# Patient Record
Sex: FEMALE | Race: WHITE
[De-identification: names, ages, dates, MRNs, and addresses within clinical notes are randomized per-mention and may not be internally consistent; named-entity substitution may affect disease eponyms.]

---

## 2017-02-06 ENCOUNTER — HOSPITAL ENCOUNTER (OUTPATIENT)
Dept: HOSPITAL 17 - PHED | Age: 77
Setting detail: OBSERVATION
Discharge: HOME | End: 2017-02-06
Attending: FAMILY MEDICINE | Admitting: FAMILY MEDICINE
Payer: MEDICARE

## 2017-02-06 VITALS
RESPIRATION RATE: 16 BRPM | OXYGEN SATURATION: 91 % | HEART RATE: 81 BPM | TEMPERATURE: 99.7 F | SYSTOLIC BLOOD PRESSURE: 167 MMHG | DIASTOLIC BLOOD PRESSURE: 80 MMHG

## 2017-02-06 VITALS — BODY MASS INDEX: 28.37 KG/M2 | HEIGHT: 67 IN | WEIGHT: 180.78 LBS

## 2017-02-06 VITALS
RESPIRATION RATE: 19 BRPM | HEART RATE: 87 BPM | OXYGEN SATURATION: 93 % | DIASTOLIC BLOOD PRESSURE: 61 MMHG | SYSTOLIC BLOOD PRESSURE: 188 MMHG

## 2017-02-06 VITALS
SYSTOLIC BLOOD PRESSURE: 167 MMHG | RESPIRATION RATE: 20 BRPM | TEMPERATURE: 99.6 F | DIASTOLIC BLOOD PRESSURE: 76 MMHG | HEART RATE: 85 BPM | OXYGEN SATURATION: 94 %

## 2017-02-06 VITALS
DIASTOLIC BLOOD PRESSURE: 93 MMHG | RESPIRATION RATE: 16 BRPM | HEART RATE: 87 BPM | SYSTOLIC BLOOD PRESSURE: 159 MMHG | OXYGEN SATURATION: 93 %

## 2017-02-06 VITALS — OXYGEN SATURATION: 95 %

## 2017-02-06 VITALS — OXYGEN SATURATION: 93 %

## 2017-02-06 DIAGNOSIS — Z85.3: ICD-10-CM

## 2017-02-06 DIAGNOSIS — S61.411A: ICD-10-CM

## 2017-02-06 DIAGNOSIS — R07.89: Primary | ICD-10-CM

## 2017-02-06 DIAGNOSIS — M19.90: ICD-10-CM

## 2017-02-06 DIAGNOSIS — Z87.891: ICD-10-CM

## 2017-02-06 DIAGNOSIS — J06.9: ICD-10-CM

## 2017-02-06 DIAGNOSIS — J44.9: ICD-10-CM

## 2017-02-06 DIAGNOSIS — M06.9: ICD-10-CM

## 2017-02-06 DIAGNOSIS — F41.9: ICD-10-CM

## 2017-02-06 DIAGNOSIS — M11.20: ICD-10-CM

## 2017-02-06 LAB
ALP SERPL-CCNC: 100 U/L (ref 45–117)
ALT SERPL-CCNC: 16 U/L (ref 10–53)
ANION GAP SERPL CALC-SCNC: 7 MEQ/L (ref 5–15)
APTT BLD: 19.9 SEC (ref 24.3–30.1)
AST SERPL-CCNC: 33 U/L (ref 15–37)
BASOPHILS # BLD AUTO: 0.3 TH/MM3 (ref 0–0.2)
BASOPHILS NFR BLD: 1.5 % (ref 0–2)
BILIRUB SERPL-MCNC: 0.8 MG/DL (ref 0.2–1)
BUN SERPL-MCNC: 16 MG/DL (ref 7–18)
CHLORIDE SERPL-SCNC: 105 MEQ/L (ref 98–107)
CK MB SERPL-MCNC: 3 NG/ML (ref 0.5–3.6)
CK MB SERPL-MCNC: 3.2 NG/ML (ref 0.5–3.6)
CK SERPL-CCNC: 115 U/L (ref 26–192)
CK SERPL-CCNC: 122 U/L (ref 26–192)
CK SERPL-CCNC: 168 U/L (ref 26–192)
EOSINOPHIL # BLD: 0.4 TH/MM3 (ref 0–0.4)
EOSINOPHIL NFR BLD: 2.4 % (ref 0–4)
ERYTHROCYTE [DISTWIDTH] IN BLOOD BY AUTOMATED COUNT: 12.7 % (ref 11.6–17.2)
GFR SERPLBLD BASED ON 1.73 SQ M-ARVRAT: 74 ML/MIN (ref 89–?)
HCO3 BLD-SCNC: 31.9 MEQ/L (ref 21–32)
HCT VFR BLD CALC: 44.4 % (ref 35–46)
HEMO FLAGS: (no result)
INR PPP: 0.9 RATIO
LYMPHOCYTES # BLD AUTO: 1.6 TH/MM3 (ref 1–4.8)
LYMPHOCYTES NFR BLD AUTO: 9.1 % (ref 9–44)
MCH RBC QN AUTO: 32.1 PG (ref 27–34)
MCHC RBC AUTO-ENTMCNC: 33.5 % (ref 32–36)
MCV RBC AUTO: 95.7 FL (ref 80–100)
MONOCYTES NFR BLD: 7.9 % (ref 0–8)
NEUTROPHILS # BLD AUTO: 13.7 TH/MM3 (ref 1.8–7.7)
NEUTROPHILS NFR BLD AUTO: 79.1 % (ref 16–70)
PLAT MORPH BLD: (no result)
PLATELET # BLD: 105 TH/MM3 (ref 150–450)
PLATELET BLD QL SMEAR: (no result)
POTASSIUM SERPL-SCNC: 4.3 MEQ/L (ref 3.5–5.1)
PROTHROMBIN TIME: 10 SEC (ref 9.8–11.6)
RBC # BLD AUTO: 4.65 MIL/MM3 (ref 4–5.3)
SCAN/DIFF: (no result)
SODIUM SERPL-SCNC: 144 MEQ/L (ref 136–145)
WBC # BLD AUTO: 17.4 TH/MM3 (ref 4–11)

## 2017-02-06 PROCEDURE — 85610 PROTHROMBIN TIME: CPT

## 2017-02-06 PROCEDURE — 73130 X-RAY EXAM OF HAND: CPT

## 2017-02-06 PROCEDURE — 85730 THROMBOPLASTIN TIME PARTIAL: CPT

## 2017-02-06 PROCEDURE — 71010: CPT

## 2017-02-06 PROCEDURE — 90714 TD VACC NO PRESV 7 YRS+ IM: CPT

## 2017-02-06 PROCEDURE — 84484 ASSAY OF TROPONIN QUANT: CPT

## 2017-02-06 PROCEDURE — 99285 EMERGENCY DEPT VISIT HI MDM: CPT

## 2017-02-06 PROCEDURE — 94640 AIRWAY INHALATION TREATMENT: CPT

## 2017-02-06 PROCEDURE — 82550 ASSAY OF CK (CPK): CPT

## 2017-02-06 PROCEDURE — 80053 COMPREHEN METABOLIC PANEL: CPT

## 2017-02-06 PROCEDURE — 82552 ASSAY OF CPK IN BLOOD: CPT

## 2017-02-06 PROCEDURE — 12013 RPR F/E/E/N/L/M 2.6-5.0 CM: CPT

## 2017-02-06 PROCEDURE — 94150 VITAL CAPACITY TEST: CPT

## 2017-02-06 PROCEDURE — G0378 HOSPITAL OBSERVATION PER HR: HCPCS

## 2017-02-06 PROCEDURE — 94664 DEMO&/EVAL PT USE INHALER: CPT

## 2017-02-06 PROCEDURE — 93005 ELECTROCARDIOGRAM TRACING: CPT

## 2017-02-06 PROCEDURE — 85025 COMPLETE CBC W/AUTO DIFF WBC: CPT

## 2017-02-06 PROCEDURE — 90471 IMMUNIZATION ADMIN: CPT

## 2017-02-06 NOTE — PD
HPI


Chief Complaint:  Cold / Flu Symptoms


Time Seen by Provider:  07:00


Travel History


International Travel<30 days:  No


Contact w/Intl Traveler<30days:  No


Traveled to known affect area:  No





History of Present Illness


HPI


76 years old female complains of left chest discomfort.  Patient states the 

symptoms started about 9 days ago.  Patient states that she has persistent left 

chest discomfort, not associated with exertion.  Patient denies any pain 

radiation.  Patient denies palpitation nausea vomiting diaphoresis.  Patient 

denies any fever chills.  Patient states that she has intermittent mild 

productive cough for the past 5 days.  Patient has history of COPD.  Patient 

contacted her physician about 5 days ago and was given prescription for Medrol 

Dosepak.  Patient states that the discomfort got better with the Medrol 

Dosepak.  Patient denies any history of CAD.  Patient denies history 

hypertension, diabetes, dyslipidemia.  Patient states that she injured right 

hand on the elevator door last night.  Patient states that she has a laceration 

on the back of her right hand.  Patient's not up-to-date with TD booster.





PFSH


Past Medical History


Hx Anticoagulant Therapy:  No


Anxiety:  Yes


COPD:  Yes


Diabetes:  No


Respiratory:  Yes (COPD)


Pregnant?:  Not Pregnant


Menopausal:  Yes





Past Surgical History


 Section:  Yes (X2)


Hysterectomy:  No


Neurologic Surgery:  Yes (DISCECTOMY)


Tonsillectomy:  Yes


Other Surgery:  Yes (LUMPECTOMY RIGHT BREAST)





Social History


Alcohol Use:  Yes (WINE WITH DINNER)


Tobacco Use:  No


Substance Use:  No





Allergies-Medications


(Allergen,Severity, Reaction):  


Coded Allergies:  


     Shellfish (Verified  Allergy, Severe, Anaphylaxis, 17)


Reported Meds & Prescriptions





Reported Meds & Active Scripts


Active


Reported


Prednisone 1 Mg Tab 1 Mg PO AS DIRECTED


Paxil (Paroxetine HCl) 10 Mg Tab Unknown Dose PO DAILY


Prednisone 1 Mg Tab 7.5 PO DAILY


Plaquenil (Hydroxychloroquine Sulfate) 200 Mg Tab Unknown Dose PO BID


     Take with food








Review of Systems


General / Constitutional:  No: Fever


Eyes:  No: Visual changes


HENT:  No: Headaches


Cardiovascular:  Positive: Chest Pain or Discomfort


Respiratory:  No: Shortness of Breath


Gastrointestinal:  No: Abdominal Pain


Genitourinary:  No: Dysuria


Musculoskeletal:  Positive: Pain


Skin:  No Rash


Neurologic:  No: Weakness


Psychiatric:  No: Depression


Endocrine:  No: Polydipsia


Hematologic/Lymphatic:  No: Easy Bruising





Physical Exam


Narrative


GENERAL: Well-nourished, well-developed patient.


SKIN: Warm and dry.


HEAD: Normocephalic.


EYES: No scleral icterus. No injection or drainage. 


NECK: Supple, trachea midline. No JVD or lymphadenopathy.


CARDIOVASCULAR: Regular rate and rhythm without murmurs, gallops, or rubs. 


RESPIRATORY: Breath sounds equal bilaterally. No accessory muscle use.  Patient 

had mild expiratory wheezes on the right lung.  Few rhonchi at the bases.


GASTROINTESTINAL: Abdomen soft, non-tender, nondistended. 


MUSCULOSKELETAL: No cyanosis, or edema.  Patient has a 3 cm skin avulsion 

laceration dorsal aspect of the right hand. 


BACK: Nontender without obvious deformity. No CVA tenderness. 


Neurologic exam normal.





Data


Data


Last Documented VS





Vital Signs








  Date Time  Temp Pulse Resp B/P Pulse Ox O2 Delivery O2 Flow Rate FiO2


 


17 07:15     95 Room Air  


 


17 06:50 99.6 85 20 167/76    








Orders





 Electrocardiogram (17 07:08)


Complete Blood Count With Diff (17 07:08)


Comprehensive Metabolic Panel (17 07:08)


Creatine Kinase (Cpk) (17 07:08)


Troponin I (17 07:08)


Prothrombin Time / Inr (Pt) (17 07:08)


Act Partial Throm Time (Ptt) (17 07:08)


Chest, Single Ap (17 07:08)


Iv Access Insert/Monitor (17 07:08)


Ecg Monitoring (17 07:08)


Oximetry (17 07:08)


Hand, Complete (Xfx1cil) (17 07:08)


Tetanus/Diphtheria Tox Adult (Tetanus/Di (17 07:15)





Labs





 Laboratory Tests








Test 17





 07:35


 


White Blood Count 17.4 TH/MM3


 


Red Blood Count 4.65 MIL/MM3


 


Hemoglobin 14.9 GM/DL


 


Hematocrit 44.4 %


 


Mean Corpuscular Volume 95.7 FL


 


Mean Corpuscular Hemoglobin 32.1 PG


 


Mean Corpuscular Hemoglobin 33.5 %





Concent 


 


Red Cell Distribution Width 12.7 %


 


Platelet Count 105 TH/MM3


 


Mean Platelet Volume 9.6 FL


 


Neutrophils (%) (Auto) 79.1 %


 


Lymphocytes (%) (Auto) 9.1 %


 


Monocytes (%) (Auto) 7.9 %


 


Eosinophils (%) (Auto) 2.4 %


 


Basophils (%) (Auto) 1.5 %


 


Neutrophils # (Auto) 13.7 TH/MM3


 


Lymphocytes # (Auto) 1.6 TH/MM3


 


Monocytes # (Auto) 1.4 TH/MM3


 


Eosinophils # (Auto) 0.4 TH/MM3


 


Basophils # (Auto) 0.3 TH/MM3


 


CBC Comment AUTO DIFF 


 


Differential Comment AUTO DIFF





 CONFIRMED


 


Platelet Estimate LOW 


 


Platelet Morphology Comment ENLARGED 


 


Prothrombin Time 10.0 SEC


 


Prothromb Time International 0.9 RATIO





Ratio 


 


Activated Partial 19.9 SEC





Thromboplast Time 


 


Sodium Level 144 MEQ/L


 


Potassium Level 4.3 MEQ/L


 


Chloride Level 105 MEQ/L


 


Carbon Dioxide Level 31.9 MEQ/L


 


Anion Gap 7 MEQ/L


 


Blood Urea Nitrogen 16 MG/DL


 


Creatinine 0.76 MG/DL


 


Estimat Glomerular Filtration 74 ML/MIN





Rate 


 


Random Glucose 109 MG/DL


 


Calcium Level 8.5 MG/DL


 


Total Bilirubin 0.8 MG/DL


 


Aspartate Amino Transf 33 U/L





(AST/SGOT) 


 


Alanine Aminotransferase 16 U/L





(ALT/SGPT) 


 


Alkaline Phosphatase 100 U/L


 


Total Creatine Kinase 168 U/L


 


Troponin I 0.02 NG/ML


 


Total Protein 6.8 GM/DL


 


Albumin 3.6 GM/DL











[facility]


Medical Decision Making


Medical Screen Exam Complete:  Yes


Emergency Medical Condition:  Yes


Interpretation(s)


8:39 AM.  CBC WBC 17.4.  Platelet 105.  79 neutrophil.  CMP within normal 

limit.  Cardiac enzymes are normal.


9:13 AM.


Last Impressions








Chest X-Ray 17 0708 Signed





Impressions: 





 Service Date/Time:  2017 07:57 - CONCLUSION: No acute 





 disease.       Elieser Montejo MD 





X-ray right hand shows chronic changes.


Differential Diagnosis


Differential diagnosis including acute exacerbation COPD, bronchitis, pneumonia

, angina, MI, PE, pneumothorax.


Narrative Course


76 years old female with left chest discomfort, coughing, right hand injury.





Procedures


**Procedure Narrative**


Saline wash.  Dermabond applied to the laceration to the right hand.





Diagnosis





 Primary Impression:  


 Chest pain


 Qualified Code:  R07.9 - Chest pain, unspecified type


 Additional Impressions:  


 Laceration of right hand


 Qualified Code:  S61.411A - Laceration of right hand, initial encounter


 URI (upper respiratory infection)


 Qualified Code:  J06.9 - Upper respiratory tract infection, unspecified type








Skyler Cavazos MD 2017 07:20

## 2017-02-06 NOTE — RADHPO
EXAM DATE/TIME:  2017 08:01 

 

[facility] COMPARISON:     

No previous studies available for comparison.

 

                     

INDICATIONS:     

Right posterior hand laceration/pain across the metacarpals after getting had caught in elevator door
.

                     

 

MEDICAL HISTORY:     

Chronic obstructive pulmonary disease.          

 

SURGICAL HISTORY:     

 section.   Discectomy. Lumpectomy.

 

ENCOUNTER:     

Initial                                        

 

ACUITY:     

1 day      

 

PAIN SCORE:     

7/10

 

LOCATION:     

Right posterior hand.

 

FINDINGS:     

There is no acute fracture or dislocation of the right hand.  Moderate osteoarthritis is noted involv
ing the first carpal metacarpal joint.  Chondrocalcinosis is noted involving the expected region of t
he triangular fibrocartilage complex.  Subchondral cyst is noted involving the lunate.  

 

CONCLUSION:     

 

1.  No acute fracture or dislocation. 

2.  Moderate osteoarthritis involving the first carpal metacarpal joint.  

3.  Chondrocalcinosis involving the triangular fibrocartilage complex. 

4.  Subchondral cyst within the lunate.  

 

 Elieser Montejo MD on 2017 at 8:53                

Board Certified Radiologist.

 This report was verified electronically.

## 2017-02-06 NOTE — RADHPO
EXAM DATE/TIME:  2017 07:57 

 

[facility] COMPARISON:     

CHEST SINGLE AP, 2015, 7:39.

 

                     

INDICATIONS :     

Left chest discomfort, cough.

                     

 

MEDICAL HISTORY :     

Chronic obstructive pulmonary disease.          

 

SURGICAL HISTORY :     

 section.   Discectomy. Lumpectomy.

 

ENCOUNTER:     

Initial                                        

 

ACUITY:     

2 weeks      

 

PAIN SCORE:     

7/10

 

LOCATION:     

Left chest 

 

FINDINGS:     

A single view of the chest demonstrates the lungs to be symmetrically aerated without evidence of mas
s, infiltrate or effusion.  The cardiomediastinal contours are unremarkable.  Osseous structures are 
intact.

 

CONCLUSION:     No acute disease.  

 

 

 

 Elieser Montejo MD on 2017 at 8:39           

Board Certified Radiologist.

 This report was verified electronically.

## 2017-02-06 NOTE — HHI.DCPOC
Discharge Care Plan


Diagnosis:  


(1) Chest pressure


(2) Chronic obstructive pulmonary disease


Goals to Promote Your Health


* To prevent worsening of your condition and complications


* To maintain your health at the optimal level


Directions to Meet Your Goals


*** Take your medications as prescribed


*** Follow your dietary instruction


*** Follow activity as directed








*** Keep your appointments as scheduled


*** Take your immunizations and boosters as scheduled


*** If your symptoms worsen call your PCP, if no PCP go to Urgent Care Center 

or Emergency Room***


*** Smoking is Dangerous to Your Health. Avoid second hand smoke***


***Call the 24-hour hour crisis hotline for domestic abuse at 1-369.888.1615***








Grant Glass Feb 6, 2017 13:27

## 2017-02-06 NOTE — HHI.HP
__________________________________________________





[facility]


Service


[facility]


Primary Care Physician


Evelyn Villalobos MD


Admission Diagnosis


chest pain.  URI.


Diagnoses:  


(1) Chest pressure


Diagnosis:  Principal





(2) Chronic obstructive pulmonary disease


Diagnosis:  Secondary





(3) Leukocytosis


Diagnosis:  Secondary





(4) Rheumatoid arthritis


Diagnosis:  Secondary





(5) Anxiety


Chief Complaint:  


Chest pressure


Travel History


International Travel<30 Days:  No


Contact w/Intl Traveler <30 Da:  No


Traveled to Known Affected Are:  No


History of Present Illness


76-year-old  female with known history of chronic obstructive 

pulmonary disease, anxiety, rheumatoid arthritis who presented to hospital 

because of chest discomfort.  Patient states that she has been having symptoms 

for up to 2 weeks with a pressure type sensation in her left anterior chest 

that has remained persistent but improving without any radiation to the neck, 

back, shoulder, arm.  Denied any nausea, vomiting, diaphoresis.  Patient states 

he started developing cough, congestion, phlegm production with yellow 

coloration with some minimal shortness of breath with associated wheeze.  The 

patient does have chronic obstructive pulmonary disease and she thought that 

she may be developing bronchitis so she called her pulmonologist, Dr. thakur 

approximately a week ago and was prescribed a steroid pack in which she is down 

to 10 mg daily this time.  Patient states that she usually gets prescribed an 

antibiotic whenever this occurs, however her primary doctor did not do at this 

time.  She states that the discomfort has significantly improved and her wheeze 

has improved.  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 

party and she overdid herself.  She woke up this morning with increased wheeze 

and chest pressure so she came to the hospital for evaluation.  Patient is 

hoping to get prescribed a antibiotic can be sent home.  However because of her 

chest discomfort, it was recommended by the ER physician that the patient be 

observed in the chest pain center for further evaluation management.





Review of Systems


Constitutional:  DENIES: Diaphoretic episodes, Fatigue, Fever, Weight gain, 

Weight loss, Chills, Dizziness, Change in appetite, Night Sweats


Eyes:  DENIES: Blurred vision, Diplopia, Eye inflammation, Eye pain, Vision loss

, Double Vision


Ears, nose, mouth, throat:  DENIES: Vertigo, Nasal discharge, Throat pain, Ear 

Pain, Running Nose, Toothache


Respiratory:  COMPLAINS OF: Cough,  DENIES: Apneas, Snoring, Wheezing, 

Hemoptysis, Sputum production, Shortness of breath


Cardiovascular:  COMPLAINS OF: Chest pain (pressure),  DENIES: Palpitations, 

Syncope, Dyspnea on Exertion, Lower Extremity Edema, Orthopnea


Gastrointestinal:  DENIES: Abdominal pain, Black stools, Bloody stools, 

Constipation, Diarrhea, Nausea, Vomiting, Difficulty Swallowing, Anorexia


Neurologic:  DENIES: Abnormal gait, Headache, Localized weakness, Paresthesias, 

Seizures, Speech Problems, Tremor, Poor Balance


Psychiatric:  COMPLAINS OF: Anxiety,  DENIES: Confusion, Mood changes, 

Depression





Past Family Social History


Past Medical History


Chronic obstructive pulmonary disease


Rheumatoid arthritis


History of breast cancer, survival for 7 years


Anxiety


Past Surgical History


Cataract surgery


Tonsillectomy


Back surgery


Right breast lumpectomy


Reported Medications





Reported Meds & Active Scripts


Active


Reported


Prednisone 1 Mg Tab 1 Mg PO AS DIRECTED


Paxil (Paroxetine HCl) 10 Mg Tab Unknown Dose PO DAILY


Prednisone 1 Mg Tab 7.5 PO DAILY


Plaquenil (Hydroxychloroquine Sulfate) 200 Mg Tab Unknown Dose PO BID


     Take with food


Allergies:  


Coded Allergies:  


     Shellfish (Verified  Allergy, Severe, Anaphylaxis, 17)


Family History


Reviewed is significant for mother  from throat cancer


Social History


Patient quit smoking in , prior to that she smoked up to 2 pack a 

cigarettes a day since she was 19 years old.  She does drink 2 glasses of wine 

daily.  Denies any illicit drugs





Physical Exam


Vital Signs





 Vital Signs








  Date Time  Temp Pulse Resp B/P Pulse Ox O2 Delivery O2 Flow Rate FiO2


 


17 12:31 99.7 81 16 167/80 91   


 


17 11:12  87 16 159/93 93 Room Air  


 


17 11:06      Room Air  21


 


17 11:00     95   21


 


17 09:57  87 19 188/61 93 Room Air  


 


17 09:56     93 Room Air  


 


17 07:15     95 Room Air  


 


17 06:50 99.6 85 20 167/76 94   








Physical Exam


GENERAL: Well-developed, well-nourished, in no acute distress. alert and 

orientated


HEENT: Head is normocephalic without any lesions or masses noted. Facial 

features are symmetric. Eyes: Pupils equal round reactive to light. Extraocular 

muscles are intact. Conjunctivae were clear. Oropharyngeal: Pharynx without any 

erythema edema. Tongue is midline without deviation. Buccal mucosa is moist 

without any masses or lesions


NECK: Supple without any masses. Trachea midline no deviation. No JVD, no 

bruits are appreciated


CARDIAC: Regular rhythm, regular rate. S1/S2 are heard. No murmurs gallops or 

rubs.


LUNGS: Mild expiratory wheeze noted in the left anterior chest, no rhonchi or 

rales. No use of accessory muscles on inspiration or expiration.


ABDOMEN: Soft, nontender. Nondistended. Bowel sounds heard in all 4 quadrants. 

No organomegaly or masses. Negative rebound, negative guarding


EXTREMITIES: No edema, pulses are equal bilaterally. No cyanosis or clubbing


NEUROLOGY: Mood and affect appear appropriate. Cranial nerves II through XII 

grossly intact. Muscle strength 5/5 in upper and lower extremities bilaterally. 

Deep tendon reflexes are 2+ in upper and lower extremities bilaterally.


Laboratory





Laboratory Tests








Test 17





 07:35 11:30


 


White Blood Count 17.4  


 


Red Blood Count 4.65  


 


Hemoglobin 14.9  


 


Hematocrit 44.4  


 


Mean Corpuscular Volume 95.7  


 


Mean Corpuscular Hemoglobin 32.1  


 


Mean Corpuscular Hemoglobin 33.5  





Concent  


 


Red Cell Distribution Width 12.7  


 


Platelet Count 105  


 


Mean Platelet Volume 9.6  


 


Neutrophils (%) (Auto) 79.1  


 


Lymphocytes (%) (Auto) 9.1  


 


Monocytes (%) (Auto) 7.9  


 


Eosinophils (%) (Auto) 2.4  


 


Basophils (%) (Auto) 1.5  


 


Neutrophils # (Auto) 13.7  


 


Lymphocytes # (Auto) 1.6  


 


Monocytes # (Auto) 1.4  


 


Eosinophils # (Auto) 0.4  


 


Basophils # (Auto) 0.3  


 


CBC Comment AUTO DIFF  


 


Differential Comment AUTO DIFF 





 CONFIRMED 


 


Platelet Estimate LOW  


 


Platelet Morphology Comment ENLARGED  


 


Prothrombin Time 10.0  


 


Prothromb Time International 0.9  





Ratio  


 


Activated Partial 19.9  





Thromboplast Time  


 


Sodium Level 144  


 


Potassium Level 4.3  


 


Chloride Level 105  


 


Carbon Dioxide Level 31.9  


 


Anion Gap 7  


 


Blood Urea Nitrogen 16  


 


Creatinine 0.76  


 


Estimat Glomerular Filtration 74  





Rate  


 


Random Glucose 109  


 


Calcium Level 8.5  


 


Total Bilirubin 0.8  


 


Aspartate Amino Transf 33  





(AST/SGOT)  


 


Alanine Aminotransferase 16  





(ALT/SGPT)  


 


Alkaline Phosphatase 100  


 


Total Creatine Kinase 168  122 


 


Troponin I 0.02  0.03 


 


Total Protein 6.8  


 


Albumin 3.6  


 


Creatine Kinase MB  3.2 








Result Diagram:  


17





Imaging





Last Impressions








Hand X-Ray 17 Signed





Impressions: 





 Service Date/Time:  2017 08:01 - CONCLUSION:   1.  No 

acute 





 fracture or dislocation.  2.  Moderate osteoarthritis involving the first 

carpal 





 metacarpal joint.   3.  Chondrocalcinosis involving the triangular 





 fibrocartilage complex.  4.  Subchondral cyst within the lunate.     Elieser Montejo MD 


 


Chest X-Ray 17 Signed





Impressions: 





 Service Date/Time:  2017 07:57 - CONCLUSION: No acute 





 disease.       Elieser Montejo MD 











Assessment and Plan


Assessment and Plan


Chest pressure


Etiology indeterminate this time, could be secondary to COPD, bronchitis, 

upper respiratory infection, cardiac etiology


Patient with minimal risk factors include age, history of tobacco use


Thus far, cardiac enzymes are negative for any acute coronary event, will 

continue 1 more set


Thus far EKG shows sinus rhythm with lateral T-wave changes which are 

nonsignificant without any changes


Continue aspirin and nitroglycerin as needed


Patient indicates that this is same chest discomfort that she gets whenever 

she has COPD exacerbation, bronchitis.  Discussed with patient's primary 

medical doctor Dr. Villalobos, notified her that patient appears to have 

noncardiac chest pain, patient does not feel like she needs to undergo any 

cardiac testing because this is same symptoms she has when she has her lung 

problems.  Patient was notified to follow-up with Dr. Villalobos.  Dr. Villalobos 

is aware and agrees with plan of care.





Chronic obstructive pulmonary disease, possible early bronchitis


Continue O2 supplementation maintain O2 sats greater than 92%


Give 1 dose of Solu-Medrol 60 mg IV, Continue prednisone 10 mg by mouth twice 

daily


Start Levaquin 500 mg daily, patient does appear to have some underlying 

possible chronic kidney disease stage III


Continue nebulizer treatments every 6 hours and every 2 hours as needed


Start incentive spirometry


Start Robitussin Cough medicine





Leukocytosis


Likely secondary to recent steroid use


Continue follow CBC





Rheumatoid arthritis


Continue Plaquenil





Anxiety


Continue Paxil





DVT prevention


Sequential compression devices





Written by Grant Glass PA-C, acting as scribe for Dr. Garcia on 17 at 

1330.  


The documentation accurately reflects the work and decisions performed face-to-

face by Dr. Garcia on 17 at 1330.








Discharge disposition





Discharge home in stable condition





Activity: Ad darin.





Diet: Healthy heart diet





Medications per medication reconciliation





Follow-up primary medical doctor in one week





Problem Qualifiers





(1) Chronic obstructive pulmonary disease:  


Qualified Code:  J44.9 - Chronic obstructive pulmonary disease, unspecified 

COPD type


(2) Leukocytosis:  


Qualified Code:  D72.829 - Leukocytosis, unspecified type


(3) Rheumatoid arthritis:  


Qualified Code:  M06.9 - Rheumatoid arthritis, involving unspecified site, 

unspecified rheumatoid factor presence





Grant Glass 2017 13:09

## 2017-02-06 NOTE — EKG
Date Performed: 02/06/2017       Time Performed: 07:39:06

 

PTAGE:      76 years

 

EKG:      Sinus rhythm 

 

 Lateral T wave changes are nonspecific Borderline ECG

 

PREVIOUS TRACING       : 01/12/2015 06.46

 

DOCTOR:   Larry Blackwell  Interpretating Date/Time  02/06/2017 11:34:49

## 2017-02-07 NOTE — EKG
Date Performed: 02/06/2017       Time Performed: 14:09:08

 

PTAGE:      76 years

 

EKG:      Sinus rhythm 

 

 with PAC(s). Since previous tracing, no significant change noted Borderline ECG

 

PREVIOUS TRACING       : 02/06/2017 11.09

 

DOCTOR:   Kenyon Núñez  Interpretating Date/Time  02/07/2017 17:30:29

## 2017-02-07 NOTE — EKG
Date Performed: 02/06/2017       Time Performed: 11:09:20

 

PTAGE:      76 years

 

EKG:      Sinus rhythm 

 

. Lateral T wave changes are nonspecific Since previous tracing, no significant change noted Borderli
ne ECG

 

PREVIOUS TRACING       : 02/06/2017 07.39

 

DOCTOR:   Kenyon Núñez  Interpretating Date/Time  02/07/2017 17:30:12

## 2017-03-02 ENCOUNTER — HOSPITAL ENCOUNTER (OUTPATIENT)
Dept: HOSPITAL 17 - CLAB | Age: 77
End: 2017-03-02
Attending: INTERNAL MEDICINE
Payer: MEDICARE

## 2017-03-02 DIAGNOSIS — R53.83: Primary | ICD-10-CM

## 2017-03-02 DIAGNOSIS — R07.89: ICD-10-CM

## 2017-03-02 DIAGNOSIS — I49.9: ICD-10-CM

## 2017-03-02 LAB — T4 FREE SERPL-MCNC: 1.09 NG/DL (ref 0.76–1.46)

## 2017-03-02 PROCEDURE — 84443 ASSAY THYROID STIM HORMONE: CPT

## 2017-03-02 PROCEDURE — 36415 COLL VENOUS BLD VENIPUNCTURE: CPT

## 2017-03-02 PROCEDURE — 84439 ASSAY OF FREE THYROXINE: CPT

## 2017-04-12 ENCOUNTER — HOSPITAL ENCOUNTER (OUTPATIENT)
Dept: HOSPITAL 17 - HRSP | Age: 77
End: 2017-04-12
Attending: INTERNAL MEDICINE
Payer: MEDICARE

## 2017-04-12 DIAGNOSIS — J44.9: Primary | ICD-10-CM

## 2017-04-12 PROCEDURE — 94726 PLETHYSMOGRAPHY LUNG VOLUMES: CPT

## 2017-04-12 PROCEDURE — 94729 DIFFUSING CAPACITY: CPT

## 2017-04-12 PROCEDURE — 94620: CPT

## 2017-04-12 PROCEDURE — 94060 EVALUATION OF WHEEZING: CPT

## 2017-04-12 NOTE — RSPPFT
DATE OF PROCEDURE:   4/12/17



COMMENTS:   



VOLUMES

DYNAMIC:    FVC mildly reduced; FEV1 moderately reduced.

STATIC:    VTG moderately increased; RV severely increased; TLC normal.

FLOWS:    FEV1% moderately reduced; FEF 25-75 severely reduced.

DIFFUSION:    Normal.

FLOW VOLUME

      LOOP:    Pattern of variable intrathoracic airways obstruction.  



IMPRESSION:    

   

Moderately severe obstructive ventilatory defect with significant hyperinflation. Airways 
resistance is increased. There is improvement post-bronchodilator. Diffusion is normal.

## 2017-11-26 ENCOUNTER — HOSPITAL ENCOUNTER (EMERGENCY)
Dept: HOSPITAL 17 - NEPC | Age: 77
Discharge: HOME | End: 2017-11-26
Payer: MEDICARE

## 2017-11-26 VITALS
RESPIRATION RATE: 16 BRPM | DIASTOLIC BLOOD PRESSURE: 81 MMHG | HEART RATE: 88 BPM | SYSTOLIC BLOOD PRESSURE: 182 MMHG | OXYGEN SATURATION: 95 % | TEMPERATURE: 98.4 F

## 2017-11-26 VITALS — WEIGHT: 178.57 LBS | HEIGHT: 66 IN | BODY MASS INDEX: 28.7 KG/M2

## 2017-11-26 VITALS — SYSTOLIC BLOOD PRESSURE: 137 MMHG | DIASTOLIC BLOOD PRESSURE: 83 MMHG

## 2017-11-26 DIAGNOSIS — J44.1: ICD-10-CM

## 2017-11-26 DIAGNOSIS — J40: Primary | ICD-10-CM

## 2017-11-26 LAB
ANION GAP SERPL CALC-SCNC: 7 MEQ/L (ref 5–15)
BASOPHILS # BLD AUTO: 0 TH/MM3 (ref 0–0.2)
BASOPHILS NFR BLD: 0.1 % (ref 0–2)
BUN SERPL-MCNC: 27 MG/DL (ref 7–18)
CHLORIDE SERPL-SCNC: 100 MEQ/L (ref 98–107)
EOSINOPHIL # BLD: 0 TH/MM3 (ref 0–0.4)
EOSINOPHIL NFR BLD: 0 % (ref 0–4)
ERYTHROCYTE [DISTWIDTH] IN BLOOD BY AUTOMATED COUNT: 12.8 % (ref 11.6–17.2)
GFR SERPLBLD BASED ON 1.73 SQ M-ARVRAT: 89 ML/MIN (ref 89–?)
HCO3 BLD-SCNC: 31 MEQ/L (ref 21–32)
HCT VFR BLD CALC: 43.2 % (ref 35–46)
HEMO FLAGS: (no result)
LYMPHOCYTES # BLD AUTO: 0.5 TH/MM3 (ref 1–4.8)
LYMPHOCYTES NFR BLD AUTO: 3.4 % (ref 9–44)
MCH RBC QN AUTO: 32.4 PG (ref 27–34)
MCHC RBC AUTO-ENTMCNC: 34 % (ref 32–36)
MCV RBC AUTO: 95.3 FL (ref 80–100)
MONOCYTES NFR BLD: 9.8 % (ref 0–8)
NEUTROPHILS # BLD AUTO: 12 TH/MM3 (ref 1.8–7.7)
NEUTROPHILS NFR BLD AUTO: 86.7 % (ref 16–70)
PLATELET # BLD: 153 TH/MM3 (ref 150–450)
POTASSIUM SERPL-SCNC: 3.9 MEQ/L (ref 3.5–5.1)
RBC # BLD AUTO: 4.53 MIL/MM3 (ref 4–5.3)
SODIUM SERPL-SCNC: 138 MEQ/L (ref 136–145)
WBC # BLD AUTO: 13.9 TH/MM3 (ref 4–11)

## 2017-11-26 PROCEDURE — 87040 BLOOD CULTURE FOR BACTERIA: CPT

## 2017-11-26 PROCEDURE — 96374 THER/PROPH/DIAG INJ IV PUSH: CPT

## 2017-11-26 PROCEDURE — 71010: CPT

## 2017-11-26 PROCEDURE — 80048 BASIC METABOLIC PNL TOTAL CA: CPT

## 2017-11-26 PROCEDURE — 99285 EMERGENCY DEPT VISIT HI MDM: CPT

## 2017-11-26 PROCEDURE — 85025 COMPLETE CBC W/AUTO DIFF WBC: CPT

## 2017-11-26 PROCEDURE — 94664 DEMO&/EVAL PT USE INHALER: CPT

## 2017-11-26 RX ADMIN — IPRATROPIUM BROMIDE AND ALBUTEROL SULFATE SCH AMPULE: .5; 3 SOLUTION RESPIRATORY (INHALATION) at 10:47

## 2017-11-26 NOTE — PD
HPI


.


Shortness of breath


Chief Complaint:  Respiratory Symptoms


Time Seen by Provider:  10:38


Travel History


International Travel<30 days:  No


Contact w/Intl Traveler<30days:  No


Traveled to known affect area:  No





History of Present Illness


HPI


This patient presents with chief complaint of shortness of breath.  Onset was 5 

days ago.  Shortness of breath is exacerbated by eating.  It is associated with 

a cough productive of purulent sputum.  It has been unrelieved by a beta 

agonist MDI.  No associated fevers or chills.





Patient reports a history of COPD.  She has an albuterol MDI which she uses as 

a rescue inhaler.  She was on a cruise for the past 5 days.  She got off the 

ship this morning.  She states that she developed the cough and shortness of 

breath at about the time the cruise started.  She states that she went and saw 

the ship's doctor who gave her the beta agonist MDI.  She has been using that 

but is not getting any better.  She subsequently presents directly to us from 

the ship for evaluation of her shortness of breath and productive cough.





[facility]


Past Medical History


Hx Anticoagulant Therapy:  No


Anxiety:  Yes


COPD:  Yes


Diabetes:  No


Diminished Hearing:  No


Respiratory:  Yes (copd)


Pneumonia:  Yes


Pregnant?:  Not Pregnant


Menopausal:  Yes





Past Surgical History


 Section:  Yes (X2)


Hysterectomy:  No


Neurologic Surgery:  Yes (DISCECTOMY)


Tonsillectomy:  Yes


Other Surgery:  Yes (LUMPECTOMY RIGHT BREAST)





Social History


Alcohol Use:  Yes (WINE 1-2 GLASSES WITH DINNER)


Tobacco Use:  No


Substance Use:  No





Allergies-Medications


(Allergen,Severity, Reaction):  


Coded Allergies:  


     shellfish derived (Unverified  Allergy, Severe, Anaphylaxis, 17)


Reported Meds & Prescriptions





Reported Meds & Active Scripts


Active


Duoneb (Ipratropium-Albuterol Neb) 0.5-2.5 Mg/3 Ml Neb 1 Ampule NEB Q6HR NEB 30 

Days


Levaquin (Levofloxacin) 500 Mg Tab 500 Mg PO DAILY 7 Days


Reported


Prednisone 1 Mg Tab 1 Mg PO AS DIRECTED


Paxil (Paroxetine HCl) 10 Mg Tab Unknown Dose PO DAILY


Prednisone 1 Mg Tab 7.5 PO DAILY


Plaquenil (Hydroxychloroquine Sulfate) 200 Mg Tab Unknown Dose PO BID


     Take with food








Review of Systems


Except as stated in HPI:  all other systems reviewed are Neg


General / Constitutional:  No: Fever, Chills


Cardiovascular:  No: Chest Pain or Discomfort


Respiratory:  Positive: Cough, Shortness of Breath


Gastrointestinal:  Positive: Loss of Appetite





Physical Exam


Narrative


GENERAL: Awake and alert and in no acute distress.


SKIN:  warm/dry.  Good color.


HEAD: Normocephalic.  Atraumatic.


EYES: Pupils equal and round. No scleral icterus. No injection or drainage. 


ENT: No nasal bleeding or discharge.  Mucous membranes pink and moist.


NECK: Trachea midline.  Full range of motion without pain.. 


CARDIOVASCULAR: Regular rate and rhythm.  Normal heart sounds.


RESPIRATORY: Able to speak in complete sentences without any respiratory 

distress.  No accessory muscle use.  Diffuse expiratory wheezing heard with 

coughing. Breath sounds equal bilaterally. 


GASTROINTESTINAL: Abdomen soft.  Nontender.  Bowel sounds present.  

Nondistended.


: Deferred


MUSCULOSKELETAL: No obvious deformities. 


NEUROLOGICAL: Awake and alert. No obvious cranial nerve deficits.  Motor 

grossly within normal limits. Normal speech.


PSYCHIATRIC: Appropriate mood and affect; insight and judgment normal.





Data


Data


Last Documented VS





Vital Signs








  Date Time  Temp Pulse Resp B/P (MAP) Pulse Ox O2 Delivery O2 Flow Rate FiO2


 


17 10:09      Room Air  


 


17 09:52 98.4 88 16 182/81 (114) 95   








Orders





 Orders


Basic Metabolic Panel (Bmp) (17 10:10)


Complete Blood Count With Diff (17 10:10)


Blood Culture (17 10:10)


Chest, Single Ap (17 10:10)


Sodium Chloride 0.9% Flush (Ns Flush) (17 10:15)


Albuterol-Ipratropium Neb (Duoneb Neb) (17 10:45)


Methylprednisolone So Succ Inj (Solumedr (17 10:45)





Labs





Laboratory Tests








Test


  17


10:40


 


White Blood Count 13.9 TH/MM3 


 


Red Blood Count 4.53 MIL/MM3 


 


Hemoglobin 14.7 GM/DL 


 


Hematocrit 43.2 % 


 


Mean Corpuscular Volume 95.3 FL 


 


Mean Corpuscular Hemoglobin 32.4 PG 


 


Mean Corpuscular Hemoglobin


Concent 34.0 % 


 


 


Red Cell Distribution Width 12.8 % 


 


Platelet Count 153 TH/MM3 


 


Mean Platelet Volume 9.9 FL 


 


Neutrophils (%) (Auto) 86.7 % 


 


Lymphocytes (%) (Auto) 3.4 % 


 


Monocytes (%) (Auto) 9.8 % 


 


Eosinophils (%) (Auto) 0.0 % 


 


Basophils (%) (Auto) 0.1 % 


 


Neutrophils # (Auto) 12.0 TH/MM3 


 


Lymphocytes # (Auto) 0.5 TH/MM3 


 


Monocytes # (Auto) 1.4 TH/MM3 


 


Eosinophils # (Auto) 0.0 TH/MM3 


 


Basophils # (Auto) 0.0 TH/MM3 


 


CBC Comment DIFF FINAL 


 


Differential Comment  


 


Blood Urea Nitrogen 27 MG/DL 


 


Creatinine 0.65 MG/DL 


 


Random Glucose 148 MG/DL 


 


Calcium Level 9.0 MG/DL 


 


Sodium Level 138 MEQ/L 


 


Potassium Level 3.9 MEQ/L 


 


Chloride Level 100 MEQ/L 


 


Carbon Dioxide Level 31.0 MEQ/L 


 


Anion Gap 7 MEQ/L 


 


Estimat Glomerular Filtration


Rate 89 ML/MIN 


 











MDM


Medical Decision Making


Medical Screen Exam Complete:  Yes


Emergency Medical Condition:  Yes


Differential Diagnosis


Differential diagnosis includes but is not limited to viral respiratory illness

, bronchitis, pneumonia, allergies, CHF, asthma/COPD.


Narrative Course


Patient presents complaining with a cough and shortness of breath.





CBC & BMP Diagram


17 10:40








Calcium Level 9.0





CXR>>No consolidative infiltrates seen.  The chest x-ray was independently 

viewed by me.





I will treat the patient for bronchitis with steroids and Zithromax.  She 

should continue her MDI treatment.





Diagnosis





 Primary Impression:  


 Bronchitis


 Additional Impression:  


 COPD (chronic obstructive pulmonary disease)


 Qualified Codes:  J44.1 - Chronic obstructive pulmonary disease with (acute) 

exacerbation


Patient Instructions:  Acute Bronchitis (DC), General Instructions


***Med/Other Pt SpecificInfo:  Prescription(s) given


Scripts


Prednisone (48) 10 mg tab Dose Pack (Prednisone (48) 10 mg tab Dose Pack) 10 Mg 

Dspk


10 MG PO AS DIRECTED for Inflammation, #1 DSPK 0 Refills


   Prov: Madelaine Long MD         17 


Azithromycin (Zithromax Z-Sree) 250 Mg Dspk


250 MG PO AS DIRECTED for Infection, #1 DSPK 0 Refills


   500 MG (2 tabs) day 1, then 1 tab days 2-5.


   Prov: Madelaine Long MD         17


Disposition:  01 DISCHARGE HOME


Condition:  Stable











Madelaine Long MD 2017 11:30

## 2017-11-26 NOTE — RADRPT
EXAM DATE/TIME:  2017 10:20 

 

[facility] COMPARISON:     

CHEST SINGLE AP, 2015, 7:39.  CHEST SINGLE AP, 2017, 7:57.

 

                     

INDICATIONS :     

Cough, shortness of breath

                     

 

MEDICAL HISTORY :     

Chronic obstructive pulmonary disease.          

 

SURGICAL HISTORY :        

 section. Discectomy. Lumpectomy.

 

ENCOUNTER:     

Initial                                        

 

ACUITY:     

4 - 6 days      

 

PAIN SCORE:     

0/10

 

LOCATION:     

Bilateral chest 

 

FINDINGS:     

Mild motion blurring of the mid and lower lung fields.  No definite infiltrates seen.  Heart is chevy
l size.  Both hemidiaphragms remain delineated.  Hemoclips and a focal opacity in the lateral right l
ower chest wall stable from prior chest x-ray.

 

CONCLUSION:     

No consolidative infiltrates seen.

 

 

 

 Christoph Hein MD on 2017 at 10:42           

Board Certified Radiologist.

 This report was verified electronically.

## 2017-11-28 ENCOUNTER — HOSPITAL ENCOUNTER (INPATIENT)
Dept: HOSPITAL 17 - NEPE | Age: 77
LOS: 6 days | Discharge: HOME | DRG: 871 | End: 2017-12-04
Attending: HOSPITALIST | Admitting: HOSPITALIST
Payer: MEDICARE

## 2017-11-28 VITALS
SYSTOLIC BLOOD PRESSURE: 165 MMHG | OXYGEN SATURATION: 99 % | RESPIRATION RATE: 20 BRPM | DIASTOLIC BLOOD PRESSURE: 77 MMHG | HEART RATE: 112 BPM

## 2017-11-28 VITALS
RESPIRATION RATE: 22 BRPM | DIASTOLIC BLOOD PRESSURE: 80 MMHG | OXYGEN SATURATION: 94 % | SYSTOLIC BLOOD PRESSURE: 206 MMHG | HEART RATE: 165 BPM

## 2017-11-28 VITALS
RESPIRATION RATE: 22 BRPM | OXYGEN SATURATION: 100 % | TEMPERATURE: 98.2 F | SYSTOLIC BLOOD PRESSURE: 161 MMHG | HEART RATE: 77 BPM | DIASTOLIC BLOOD PRESSURE: 69 MMHG

## 2017-11-28 VITALS — OXYGEN SATURATION: 100 %

## 2017-11-28 VITALS — HEART RATE: 90 BPM

## 2017-11-28 VITALS — OXYGEN SATURATION: 98 %

## 2017-11-28 VITALS — OXYGEN SATURATION: 96 %

## 2017-11-28 VITALS — OXYGEN SATURATION: 95 %

## 2017-11-28 VITALS
HEART RATE: 108 BPM | TEMPERATURE: 98 F | OXYGEN SATURATION: 100 % | DIASTOLIC BLOOD PRESSURE: 82 MMHG | RESPIRATION RATE: 25 BRPM | SYSTOLIC BLOOD PRESSURE: 180 MMHG

## 2017-11-28 VITALS
OXYGEN SATURATION: 98 % | HEART RATE: 86 BPM | SYSTOLIC BLOOD PRESSURE: 117 MMHG | TEMPERATURE: 98.6 F | DIASTOLIC BLOOD PRESSURE: 54 MMHG | RESPIRATION RATE: 26 BRPM

## 2017-11-28 VITALS
HEART RATE: 113 BPM | DIASTOLIC BLOOD PRESSURE: 93 MMHG | TEMPERATURE: 98.7 F | RESPIRATION RATE: 28 BRPM | SYSTOLIC BLOOD PRESSURE: 214 MMHG

## 2017-11-28 VITALS — OXYGEN SATURATION: 97 %

## 2017-11-28 VITALS
HEART RATE: 170 BPM | OXYGEN SATURATION: 94 % | RESPIRATION RATE: 22 BRPM | DIASTOLIC BLOOD PRESSURE: 86 MMHG | SYSTOLIC BLOOD PRESSURE: 198 MMHG

## 2017-11-28 VITALS — DIASTOLIC BLOOD PRESSURE: 72 MMHG | SYSTOLIC BLOOD PRESSURE: 166 MMHG

## 2017-11-28 VITALS
HEART RATE: 91 BPM | SYSTOLIC BLOOD PRESSURE: 221 MMHG | RESPIRATION RATE: 24 BRPM | OXYGEN SATURATION: 99 % | DIASTOLIC BLOOD PRESSURE: 92 MMHG | TEMPERATURE: 98.1 F

## 2017-11-28 VITALS
RESPIRATION RATE: 16 BRPM | DIASTOLIC BLOOD PRESSURE: 85 MMHG | OXYGEN SATURATION: 98 % | SYSTOLIC BLOOD PRESSURE: 204 MMHG | HEART RATE: 113 BPM

## 2017-11-28 VITALS — HEART RATE: 95 BPM

## 2017-11-28 VITALS — HEART RATE: 84 BPM

## 2017-11-28 VITALS — HEART RATE: 79 BPM

## 2017-11-28 VITALS — HEART RATE: 104 BPM

## 2017-11-28 VITALS — HEART RATE: 96 BPM

## 2017-11-28 VITALS — BODY MASS INDEX: 27.99 KG/M2 | HEIGHT: 66 IN | WEIGHT: 174.17 LBS

## 2017-11-28 VITALS — HEART RATE: 87 BPM

## 2017-11-28 DIAGNOSIS — Z79.899: ICD-10-CM

## 2017-11-28 DIAGNOSIS — G92: ICD-10-CM

## 2017-11-28 DIAGNOSIS — E87.2: ICD-10-CM

## 2017-11-28 DIAGNOSIS — Z85.3: ICD-10-CM

## 2017-11-28 DIAGNOSIS — Z92.3: ICD-10-CM

## 2017-11-28 DIAGNOSIS — J96.22: ICD-10-CM

## 2017-11-28 DIAGNOSIS — I50.9: ICD-10-CM

## 2017-11-28 DIAGNOSIS — J44.1: ICD-10-CM

## 2017-11-28 DIAGNOSIS — Z87.891: ICD-10-CM

## 2017-11-28 DIAGNOSIS — A41.9: Primary | ICD-10-CM

## 2017-11-28 DIAGNOSIS — Z87.01: ICD-10-CM

## 2017-11-28 DIAGNOSIS — I11.0: ICD-10-CM

## 2017-11-28 DIAGNOSIS — I47.1: ICD-10-CM

## 2017-11-28 DIAGNOSIS — M06.9: ICD-10-CM

## 2017-11-28 LAB
ALP SERPL-CCNC: 114 U/L (ref 45–117)
ALP SERPL-CCNC: 135 U/L (ref 45–117)
ALT SERPL-CCNC: 36 U/L (ref 10–53)
ALT SERPL-CCNC: 50 U/L (ref 10–53)
ANION GAP SERPL CALC-SCNC: 5 MEQ/L (ref 5–15)
ANION GAP SERPL CALC-SCNC: 7 MEQ/L (ref 5–15)
AST SERPL-CCNC: 35 U/L (ref 15–37)
AST SERPL-CCNC: 44 U/L (ref 15–37)
BASE EXCESS BLD CALC-SCNC: -0.5 MMOL/L (ref -2–2)
BASE EXCESS BLD CALC-SCNC: 0.6 MMOL/L (ref -2–2)
BASE EXCESS BLD CALC-SCNC: 1.2 MMOL/L (ref -2–2)
BASE EXCESS BLD CALC-SCNC: 4.1 MMOL/L (ref -2–2)
BASOPHILS # BLD AUTO: 0 TH/MM3 (ref 0–0.2)
BASOPHILS # BLD AUTO: 0 TH/MM3 (ref 0–0.2)
BASOPHILS NFR BLD: 0.1 % (ref 0–2)
BASOPHILS NFR BLD: 0.2 % (ref 0–2)
BENZODIAZEPINES PNL UR: 92 % (ref 90–100)
BENZODIAZEPINES PNL UR: 95 % (ref 90–100)
BENZODIAZEPINES PNL UR: 96 % (ref 90–100)
BENZODIAZEPINES PNL UR: 97 % (ref 90–100)
BILIRUB SERPL-MCNC: 0.4 MG/DL (ref 0.2–1)
BILIRUB SERPL-MCNC: 0.5 MG/DL (ref 0.2–1)
BLOOD GAS CARBOXYHEMOGLOBIN: 0.5 % (ref 0–4)
BLOOD GAS CARBOXYHEMOGLOBIN: 0.7 % (ref 0–4)
BLOOD GAS CARBOXYHEMOGLOBIN: 0.8 % (ref 0–4)
BLOOD GAS CARBOXYHEMOGLOBIN: 0.9 % (ref 0–4)
BLOOD GAS HCO3: 26 MMOL/L (ref 22–26)
BLOOD GAS HCO3: 27 MMOL/L (ref 22–26)
BLOOD GAS HCO3: 28 MMOL/L (ref 22–26)
BLOOD GAS HCO3: 29 MMOL/L (ref 22–26)
BLOOD GAS OXYGEN CONTENT: 18.1 VOL % (ref 12–20)
BLOOD GAS OXYGEN CONTENT: 18.2 VOL % (ref 12–20)
BLOOD GAS OXYGEN CONTENT: 18.6 VOL % (ref 12–20)
BLOOD GAS OXYGEN CONTENT: 19.6 VOL % (ref 12–20)
BLOOD GAS PCO2: 55 MMHG (ref 38–42)
BLOOD GAS PCO2: 60 MMHG (ref 38–42)
BLOOD GAS PCO2: 66 MMHG (ref 38–42)
BLOOD GAS PCO2: 67 MMHG (ref 38–42)
BUN SERPL-MCNC: 26 MG/DL (ref 7–18)
BUN SERPL-MCNC: 39 MG/DL (ref 7–18)
CHLORIDE SERPL-SCNC: 106 MEQ/L (ref 98–107)
CHLORIDE SERPL-SCNC: 108 MEQ/L (ref 98–107)
CRITICAL VALUE: YES
DRAW SITE: (no result)
EOSINOPHIL # BLD: 0 TH/MM3 (ref 0–0.4)
EOSINOPHIL # BLD: 0 TH/MM3 (ref 0–0.4)
EOSINOPHIL NFR BLD: 0 % (ref 0–4)
EOSINOPHIL NFR BLD: 0 % (ref 0–4)
ERYTHROCYTE [DISTWIDTH] IN BLOOD BY AUTOMATED COUNT: 12.8 % (ref 11.6–17.2)
ERYTHROCYTE [DISTWIDTH] IN BLOOD BY AUTOMATED COUNT: 13 % (ref 11.6–17.2)
GFR SERPLBLD BASED ON 1.73 SQ M-ARVRAT: 129 ML/MIN (ref 89–?)
GFR SERPLBLD BASED ON 1.73 SQ M-ARVRAT: 86 ML/MIN (ref 89–?)
HCO3 BLD-SCNC: 28.2 MEQ/L (ref 21–32)
HCO3 BLD-SCNC: 29.1 MEQ/L (ref 21–32)
HCT VFR BLD CALC: 42.7 % (ref 35–46)
HCT VFR BLD CALC: 45.9 % (ref 35–46)
HEMO FLAGS: (no result)
HEMO FLAGS: (no result)
LYMPHOCYTES # BLD AUTO: 0.4 TH/MM3 (ref 1–4.8)
LYMPHOCYTES # BLD AUTO: 1.5 TH/MM3 (ref 1–4.8)
LYMPHOCYTES NFR BLD AUTO: 2.6 % (ref 9–44)
LYMPHOCYTES NFR BLD AUTO: 7 % (ref 9–44)
MCH RBC QN AUTO: 32 PG (ref 27–34)
MCH RBC QN AUTO: 32.5 PG (ref 27–34)
MCHC RBC AUTO-ENTMCNC: 32.8 % (ref 32–36)
MCHC RBC AUTO-ENTMCNC: 33.6 % (ref 32–36)
MCV RBC AUTO: 96.9 FL (ref 80–100)
MCV RBC AUTO: 97.5 FL (ref 80–100)
METAMYELOCYTES NFR BLD: 1 % (ref 0–1)
METAMYELOCYTES NFR BLD: 2 % (ref 0–1)
METHGB MFR BLDA: 0.6 % (ref 0–2)
METHGB MFR BLDA: 0.9 % (ref 0–2)
METHGB MFR BLDA: 1.1 % (ref 0–2)
METHGB MFR BLDA: 1.3 % (ref 0–2)
MONOCYTES NFR BLD: 11 % (ref 0–8)
MONOCYTES NFR BLD: 9.9 % (ref 0–8)
MYELOCYTES NFR BLD: 2 % (ref 0–0)
NEUTROPHILS # BLD AUTO: 13.9 TH/MM3 (ref 1.8–7.7)
NEUTROPHILS # BLD AUTO: 17.8 TH/MM3 (ref 1.8–7.7)
NEUTROPHILS NFR BLD AUTO: 81.8 % (ref 16–70)
NEUTROPHILS NFR BLD AUTO: 87.4 % (ref 16–70)
NEUTS BAND # BLD MANUAL: 14.2 TH/MM3 (ref 1.8–7.7)
NEUTS BAND # BLD MANUAL: 18.7 TH/MM3 (ref 1.8–7.7)
NEUTS BAND NFR BLD: 12 % (ref 0–6)
NEUTS BAND NFR BLD: 7 % (ref 0–6)
NEUTS SEG NFR BLD MANUAL: 72 % (ref 16–70)
NEUTS SEG NFR BLD MANUAL: 80 % (ref 16–70)
NUMBER OF ARTERIAL PUNCTURES: 1
O2/TOTAL GAS SETTING VFR VENT: 30 %
O2/TOTAL GAS SETTING VFR VENT: 30 %
O2/TOTAL GAS SETTING VFR VENT: 40 %
O2/TOTAL GAS SETTING VFR VENT: 40 %
OXYGEN DEVICE: (no result)
PLAT MORPH BLD: NORMAL
PLAT MORPH BLD: NORMAL
PLATELET # BLD: 189 TH/MM3 (ref 150–450)
PLATELET # BLD: 244 TH/MM3 (ref 150–450)
PLATELET BLD QL SMEAR: NORMAL
PLATELET BLD QL SMEAR: NORMAL
PO2 BLD: 125 MMHG (ref 61–120)
PO2 BLD: 126 MMHG (ref 61–120)
PO2 BLD: 77 MMHG (ref 61–120)
PO2 BLD: 92 MMHG (ref 61–120)
POTASSIUM SERPL-SCNC: 3.9 MEQ/L (ref 3.5–5.1)
POTASSIUM SERPL-SCNC: 4.2 MEQ/L (ref 3.5–5.1)
RBC # BLD AUTO: 4.38 MIL/MM3 (ref 4–5.3)
RBC # BLD AUTO: 4.73 MIL/MM3 (ref 4–5.3)
RBC MORPH BLD: NORMAL
SALICYLATES SERPL-MCNC: 13.5 G/DL (ref 12–16)
SALICYLATES SERPL-MCNC: 13.5 G/DL (ref 12–16)
SALICYLATES SERPL-MCNC: 14 G/DL (ref 12–16)
SALICYLATES SERPL-MCNC: 14.5 G/DL (ref 12–16)
SCAN/DIFF: (no result)
SCAN/DIFF: (no result)
SODIUM SERPL-SCNC: 141 MEQ/L (ref 136–145)
SODIUM SERPL-SCNC: 142 MEQ/L (ref 136–145)
STAT: NO
STAT: NO
STAT: YES
STAT: YES
TEMP CORR TO: 37
TEMP CORR TO: 98.6
TOXIC GRANULES BLD QL SMEAR: (no result)
TOXIC GRANULES BLD QL SMEAR: (no result)
ULNAR PULSE: PRESENT
VENT SETTINGS: (no result)
WBC # BLD AUTO: 15.8 TH/MM3 (ref 4–11)
WBC # BLD AUTO: 21.8 TH/MM3 (ref 4–11)
WBC DIFF SAMPLE: 100
WBC DIFF SAMPLE: 100
WBC TOXIC VACUOLES BLD QL SMEAR: PRESENT

## 2017-11-28 PROCEDURE — 93306 TTE W/DOPPLER COMPLETE: CPT

## 2017-11-28 PROCEDURE — 84100 ASSAY OF PHOSPHORUS: CPT

## 2017-11-28 PROCEDURE — 83036 HEMOGLOBIN GLYCOSYLATED A1C: CPT

## 2017-11-28 PROCEDURE — 87040 BLOOD CULTURE FOR BACTERIA: CPT

## 2017-11-28 PROCEDURE — 94640 AIRWAY INHALATION TREATMENT: CPT

## 2017-11-28 PROCEDURE — 87804 INFLUENZA ASSAY W/OPTIC: CPT

## 2017-11-28 PROCEDURE — 94620: CPT

## 2017-11-28 PROCEDURE — 36600 WITHDRAWAL OF ARTERIAL BLOOD: CPT

## 2017-11-28 PROCEDURE — 83735 ASSAY OF MAGNESIUM: CPT

## 2017-11-28 PROCEDURE — 85027 COMPLETE CBC AUTOMATED: CPT

## 2017-11-28 PROCEDURE — 96365 THER/PROPH/DIAG IV INF INIT: CPT

## 2017-11-28 PROCEDURE — 94002 VENT MGMT INPAT INIT DAY: CPT

## 2017-11-28 PROCEDURE — 80053 COMPREHEN METABOLIC PANEL: CPT

## 2017-11-28 PROCEDURE — 94664 DEMO&/EVAL PT USE INHALER: CPT

## 2017-11-28 PROCEDURE — 82805 BLOOD GASES W/O2 SATURATION: CPT

## 2017-11-28 PROCEDURE — 94003 VENT MGMT INPAT SUBQ DAY: CPT

## 2017-11-28 PROCEDURE — 96375 TX/PRO/DX INJ NEW DRUG ADDON: CPT

## 2017-11-28 PROCEDURE — 83605 ASSAY OF LACTIC ACID: CPT

## 2017-11-28 PROCEDURE — 71275 CT ANGIOGRAPHY CHEST: CPT

## 2017-11-28 PROCEDURE — 85007 BL SMEAR W/DIFF WBC COUNT: CPT

## 2017-11-28 PROCEDURE — 87449 NOS EACH ORGANISM AG IA: CPT

## 2017-11-28 PROCEDURE — 71010: CPT

## 2017-11-28 PROCEDURE — 82948 REAGENT STRIP/BLOOD GLUCOSE: CPT

## 2017-11-28 PROCEDURE — 93005 ELECTROCARDIOGRAM TRACING: CPT

## 2017-11-28 PROCEDURE — 94667 MNPJ CHEST WALL 1ST: CPT

## 2017-11-28 PROCEDURE — 94668 MNPJ CHEST WALL SBSQ: CPT

## 2017-11-28 PROCEDURE — 80048 BASIC METABOLIC PNL TOTAL CA: CPT

## 2017-11-28 RX ADMIN — FAMOTIDINE SCH MG: 10 INJECTION, SOLUTION INTRAVENOUS at 15:12

## 2017-11-28 RX ADMIN — DILTIAZEM HYDROCHLORIDE SCH MG: 60 TABLET, FILM COATED ORAL at 16:51

## 2017-11-28 RX ADMIN — HUMAN INSULIN SCH: 100 INJECTION, SOLUTION SUBCUTANEOUS at 15:14

## 2017-11-28 RX ADMIN — IPRATROPIUM BROMIDE AND ALBUTEROL SULFATE SCH AMPULE: .5; 3 SOLUTION RESPIRATORY (INHALATION) at 04:20

## 2017-11-28 RX ADMIN — IPRATROPIUM BROMIDE AND ALBUTEROL SULFATE SCH AMPULE: .5; 3 SOLUTION RESPIRATORY (INHALATION) at 04:21

## 2017-11-28 RX ADMIN — PHENYTOIN SODIUM SCH MLS/HR: 50 INJECTION INTRAMUSCULAR; INTRAVENOUS at 15:10

## 2017-11-28 RX ADMIN — IPRATROPIUM BROMIDE AND ALBUTEROL SULFATE SCH AMPULE: .5; 3 SOLUTION RESPIRATORY (INHALATION) at 20:20

## 2017-11-28 RX ADMIN — HUMAN INSULIN SCH: 100 INJECTION, SOLUTION SUBCUTANEOUS at 19:48

## 2017-11-28 RX ADMIN — Medication SCH ML: at 19:48

## 2017-11-28 RX ADMIN — FAMOTIDINE SCH MG: 10 INJECTION, SOLUTION INTRAVENOUS at 19:49

## 2017-11-28 RX ADMIN — ENOXAPARIN SODIUM SCH MG: 40 INJECTION SUBCUTANEOUS at 06:05

## 2017-11-28 RX ADMIN — DILTIAZEM HYDROCHLORIDE SCH MG: 60 TABLET, FILM COATED ORAL at 22:51

## 2017-11-28 RX ADMIN — IPRATROPIUM BROMIDE AND ALBUTEROL SULFATE SCH AMPULE: .5; 3 SOLUTION RESPIRATORY (INHALATION) at 15:29

## 2017-11-28 RX ADMIN — IPRATROPIUM BROMIDE AND ALBUTEROL SULFATE SCH AMPULE: .5; 3 SOLUTION RESPIRATORY (INHALATION) at 23:26

## 2017-11-28 RX ADMIN — Medication SCH ML: at 09:47

## 2017-11-28 RX ADMIN — ENALAPRILAT PRN MG: 1.25 INJECTION INTRAVENOUS at 09:46

## 2017-11-28 NOTE — RADRPT
EXAM DATE/TIME:  2017 03:22 

 

[facility] COMPARISON:     

CHEST SINGLE AP, 2017, 10:20.

 

                     

INDICATIONS :     

Shortness of breath.

                     

 

MEDICAL HISTORY :     

Chronic obstructive pulmonary disease.          

 

SURGICAL HISTORY :        

 section. Discectomy. Lumpectomy.

 

ENCOUNTER:     

Initial                                        

 

ACUITY:     

1 day      

 

PAIN SCORE:     

0/10

 

LOCATION:     

Bilateral  chest

 

FINDINGS:     

A single view of the chest demonstrates the lungs to be symmetrically aerated without evidence of mas
s, infiltrate or effusion.  The cardiomediastinal contours are unremarkable.  Osseous structures are 
intact. There is an oval calcific density with surrounding clips seen in the right lateral chest.

 

CONCLUSION:     No acute disease.  

 

 

 

 Sergey Harley MD on 2017 at 3:40           

Board Certified Radiologist.

 This report was verified electronically.

## 2017-11-28 NOTE — EKG
Date Performed: 11/28/2017       Time Performed: 05:04:54

 

PTAGE:      76 years

 

EKG:      SUPRAVENTRICULAR TACHYCARDIA NONSPECIFIC ST & T-WAVE ABNORMALITY When compared to previous 
tracing, patients heart rate Has dramatically increased. ABNORMAL RHYTHM ECG

 

PREVIOUS TRACING       : 02/06/2017 14.09

 

DOCTOR:   Aissatou Coles  Interpretating Date/Time  11/28/2017 16:41:33

## 2017-11-28 NOTE — PD
HPI


Chief Complaint:  Respiratory Symptoms


Time Seen by Provider:  03:31


Travel History


International Travel<30 days:  No


Contact w/Intl Traveler<30days:  No


Traveled to known affect area:  No





History of Present Illness


HPI


The patient is a 76 year old female who presents to the [facility] 

emergency department with a history of history of congestion and cough that 

began a week ago. Her cough has been productive of yellow to green sputum. She 

reports having low grade fevers. She has had shortness of breath and wheezing.  

She has a history of COPD.  She was seen in the emergency department and 

started on a prednisone taper on  as well as a Zithromax pack.  She 

reports that she completed the course of antibiotic yesterday and continues to 

be on the steroid.  The patient reports that this evening she was trying to get 

up to go to the bathroom when she had worsening shortness of breath.  She 

reports that she was incontinent of urine and she was not able to get to the 

bathroom.  Ambulance services were called  and the patient was noted to have 

diffuse wheezing.  The patient was placed on an albuterol nebulizer treatment 

and was given 3 prior to arrival.  IV access was obtained and the patient was 

given Solu-Medrol 125 mg IV.  A review of systems otherwise, the patient denies 

having any chest pain, however she reports having tightness when she tries to 

take a deep breath.  She denies having any prior history of heart disease.  She 

denies having any lower extremity edema, calf pain, or erythema.  On review of 

systems otherwise, she denies having any neck pain,  abdominal pain, vomiting, 

diarrhea, urinary symptoms, or neurologic symptoms.  The patient reports that 

she has had her pneumonia vaccine as well as her influenza vaccine this season.





[facility]


Past Medical History


*** Narrative Medical


The patient's past medical history is significant for COPD rheumatoid arthritis

, history of breast cancer that is post lumpectomy followed by radiation 

therapy in .


Hx Anticoagulant Therapy:  No


Anxiety:  Yes


COPD:  Yes


Diabetes:  No


Diminished Hearing:  No


Respiratory:  Yes (copd)


Pneumonia:  Yes


Influenza Vaccination:  Yes


Pregnant?:  Not Pregnant


Menopausal:  Yes





Past Surgical History


*** Narrative Surgical


The patient's past surgical history is significant for lumpectomy of the right 

breast, discectomy, tonsillectomy.


 Section:  Yes (X2)


Hysterectomy:  No


Neurologic Surgery:  Yes (DISCECTOMY)


Tonsillectomy:  Yes


Other Surgery:  Yes (LUMPECTOMY RIGHT BREAST)





Social History


Alcohol Use:  Yes (WINE 1-2 GLASSES WITH DINNER)


Tobacco Use:  No


Substance Use:  No





Allergies-Medications


(Allergen,Severity, Reaction):  


Coded Allergies:  


     shellfish derived (Unverified  Allergy, Severe, Anaphylaxis, 17)


Reported Meds & Prescriptions





Reported Meds & Active Scripts


Active


Prednisone (48) 10 mg tab Dose Pack (Prednisone) 10 Mg Dspk 10 Mg PO AS DIRECTED


Duoneb (Ipratropium-Albuterol Neb) 0.5-2.5 Mg/3 Ml Neb 1 Ampule NEB Q6HR NEB 30 

Days


Reported


Prednisone 1 Mg Tab 1 Mg PO AS DIRECTED


Paxil (Paroxetine HCl) 10 Mg Tab Unknown Dose PO DAILY


Prednisone 1 Mg Tab 7.5 PO DAILY


Plaquenil (Hydroxychloroquine Sulfate) 200 Mg Tab Unknown Dose PO BID


     Take with food








Review of Systems


Except as stated in HPI:  all other systems reviewed are Neg


General / Constitutional:  Positive: Fever, Chills


Eyes:  No: Visual changes


HENT:  Positive: Congestion, No: Headaches


Cardiovascular:  Positive: Chest Pain or Discomfort (chest tightness with 

taking a deep breath), Dyspnea on exertion


Respiratory:  Positive: Cough, Shortness of Breath


Gastrointestinal:  No: Nausea, Vomiting, Diarrhea, Abdominal Pain


Genitourinary:  No: Dysuria


Musculoskeletal:  No: Pain


Skin:  No Rash


Neurologic:  No: Weakness


Psychiatric:  No: Depression


Endocrine:  No: Polydipsia


Hematologic/Lymphatic:  No: Easy Bruising





Physical Exam


Narrative


General: 


The patient is a well-developed well-nourished female, uncomfortable appearing 

on arrival with conversational dyspnea.





Head and Neck exam: 


Head is normocephalic atraumatic. 


Eyes: EOMI, pupils are equal round and reactive to light. 


Nose: Midline septum with pink mucous membranes 


Mouth: Dentition unremarkable. Moist mucus membranes. Posterior oropharynx is 

not erythematous. No tonsillar hypertrophy. Uvula midline. Airway patent. 


Neck: No palpable lymphadenopathy. No nuchal rigidity. No thyromegaly. 





Cardiovascular: 


Sinus tachycardia in the low 100s without murmurs, gallops, or rubs. No pulse 

deficit to the extremities on simultaneous auscultation and palpation of her 

radial artery. 





Lungs: 


Expiratory wheezes audible throughout bilateral lung fields, and scattered 

rhonchi that improved with coughing.  No crackles audible.  The patient has 

accessory muscle use noted.  The patient has no tripoding.  No paroxysmal 

abdominal breathing.


 


Abdomen:


Soft, without tenderness to palpation in all 4 quadrants of the abdomen. No 

guarding, rebound, or rigidity.  Normal bowel sounds are audible.  No 

tenderness on palpation of McBurney's point.





Extremities: 


No clubbing, cyanosis, or edema. 2+ pulses in all 4 extremities.  No calf 

tenderness on palpation.





Back: 


No costovertebral angle tenderness to palpation. 





Neurologic Exam: Grossly nonfocal.





Skin: No rashes noted.  The patient is slightly diaphoretic on arrival.





Data


Data


Last Documented VS





Vital Signs








  Date Time  Temp Pulse Resp B/P (MAP) Pulse Ox O2 Delivery O2 Flow Rate FiO2


 


17 03:12  113 16 204/85 (124) 98 Aerosol Mask 6.00 


 


17 03:07 98.7       








Orders





 Orders


Complete Blood Count With Diff (17 03:14)


Comprehensive Metabolic Panel (17 03:14)


Chest, Single Ap (17 )


Electrocardiogram (17 )


Blood Culture (17 04:07)


Cath For Specimen (17 04:07)


Albuterol-Ipratropium Neb (Duoneb Neb) (17 04:15)


Ceftriaxone Inj (Rocephin Inj) (17 04:15)


Azithromycin Inj (Zithromax Inj) (17 04:15)


Lactic Acid Sepsis Protocol (17 04:07)


Sodium Chlor 0.9% 1000 Ml Inj (Ns 1000 M (17 04:18)


Resp Bipap / Cpap Non Invas Vt (17 )


Admit Order (Ed Use Only) (17 05:17)





Labs





Laboratory Tests








Test


  17


03:15 17


04:05


 


White Blood Count 21.8 TH/MM3  


 


Red Blood Count 4.73 MIL/MM3  


 


Hemoglobin 15.4 GM/DL  


 


Hematocrit 45.9 %  


 


Mean Corpuscular Volume 96.9 FL  


 


Mean Corpuscular Hemoglobin 32.5 PG  


 


Mean Corpuscular Hemoglobin


Concent 33.6 % 


  


 


 


Red Cell Distribution Width 13.0 %  


 


Platelet Count 244 TH/MM3  


 


Mean Platelet Volume 9.2 FL  


 


Neutrophils (%) (Auto) 81.8 %  


 


Lymphocytes (%) (Auto) 7.0 %  


 


Monocytes (%) (Auto) 11.0 %  


 


Eosinophils (%) (Auto) 0.0 %  


 


Basophils (%) (Auto) 0.2 %  


 


Neutrophils # (Auto) 17.8 TH/MM3  


 


Lymphocytes # (Auto) 1.5 TH/MM3  


 


Monocytes # (Auto) 2.4 TH/MM3  


 


Eosinophils # (Auto) 0.0 TH/MM3  


 


Basophils # (Auto) 0.0 TH/MM3  


 


CBC Comment AUTO DIFF  


 


Differential Total Cells


Counted 100 


  


 


 


Neutrophils % (Manual) 72 %  


 


Band Neutrophils % 12 %  


 


Lymphocytes % 5 %  


 


Monocytes % 9 %  


 


Neutrophils # (Manual) 18.7 TH/MM3  


 


Metamyelocytes 2 %  


 


Differential Comment


  FINAL DIFF


MANUAL 


 


 


Toxic Granulation 1+  


 


Toxic Vacuolation PRESENT  


 


Platelet Estimate NORMAL  


 


Platelet Morphology Comment NORMAL  


 


Red Cell Morphology Comment NORMAL  


 


Blood Urea Nitrogen 39 MG/DL  


 


Creatinine 0.67 MG/DL  


 


Random Glucose 179 MG/DL  


 


Total Protein 7.1 GM/DL  


 


Albumin 3.3 GM/DL  


 


Calcium Level 9.2 MG/DL  


 


Alkaline Phosphatase 135 U/L  


 


Aspartate Amino Transf


(AST/SGOT) 44 U/L 


  


 


 


Alanine Aminotransferase


(ALT/SGPT) 50 U/L 


  


 


 


Total Bilirubin 0.5 MG/DL  


 


Sodium Level 141 MEQ/L  


 


Potassium Level 3.9 MEQ/L  


 


Chloride Level 106 MEQ/L  


 


Carbon Dioxide Level 28.2 MEQ/L  


 


Anion Gap 7 MEQ/L  


 


Estimat Glomerular Filtration


Rate 86 ML/MIN 


  


 


 


Lactic Acid Level  1.9 mmol/L 











[facility]


Medical Decision Making


Medical Screen Exam Complete:  Yes


Emergency Medical Condition:  Yes


Medical Record Reviewed:  Yes


Differential Diagnosis


COPD exacerbation, versus pneumonia, versus pneumothorax


Narrative Course


During the course of the patients emergency department visit, the patients 

history, examination, and differential diagnosis were reviewed with the 

patient. The patient was placed on a cardiac monitor with oximetry and frequent 

blood pressure monitoring. The patient had  IV access obtained and blood work 

sent for analysis.  The patient had an ECG done on arrival that shows a sinus 

tachycardia with heart rate of 112, no acute ST segment elevation.  During the 

patient's observation on telemetry she did have intermittent episodes of SVT.  

An ECG was able to be done while she was in an episode which did spontaneously 

break on its own.  The SVT with a rate of 185 with the premature ventricular 

contraction noted.  No acute ST segment elevation.





The patient was initially provided normal saline at 30 mL per KG IV fluid bolus 

once sepsis criteria were met, Rocephin 1 g IV was given 1, Zithromax 500 IV 

was given.





The patients laboratory studies were reviewed and remarkable for a white count 

of 21.8, hemoglobin 15.4, platelets 244 with 72 neutrophils, bands 12, CMP is 

remarkable for a glucose of 179, BUN 39, lactic acid 1.9, AST 44, alkaline 

phosphatase 135





Radiology studies were reviewed and remarkable for a chest x-ray that shows no 

acute cardiopulmonary disease.





The patient was started on BiPAP as the patient continued to have tachypnea. 





The patients results were discussed with the patient, including the plan of 

care. I explained that further testing and/ or monitoring is indicated based on 

the patients history, examination, and/ or laboratory findings. Therefore, I 

recommended admission for additional evaluation. The patient expressed 

understanding and was agreeable with this plan. The patient was admitted to the 

hospital in guarded condition and [facility]


Critical Care Narrative


Aggregate critical care time was 38 minutes. Time to perform other separately 

billable procedures was not  


included in the critical care time. My time did not include minutes spent 

treating any other patients simultaneously or on  


activities that did not directly contribute to the patient's treatment.  





The services I provided to this patient were to treat and/or prevent clinically 

significant deterioration that could result  


in: Respiratory failure, versus fluid overload related to over resuscitation 

with IV fluids per sepsis, versus cardiovascular collapse





I provided critical care services requiring my management, as noted below:


Chart data review, documentation time, medication orders and management, vital 

sign assessments/reviewing monitor data,  


ordering and reviewing lab tests, ordering and interpreting/reviewing x-rays 

and diagnostic studies, care of the patient  


and discussion of the patient with the admitting physicians.





Sepsis Criteria


SIRS Criteria (2 or more):  Heart rate over 90, RR  > 20 or PaCO2 < 32, WBC > 

76469, < 4000 or > 10% bands


Sepsis Criteria (SIRS+source):  Infect source susp/known


Criteria Outcome:  Meets SIRS criteria, Meets sepsis criteria





Physician Communication


Physician Communication


The patient's case including history, pertinent physical examination findings, 

and laboratory studies were discussed with Dr. Velasquez. It was agreed that the 

patient would be admitted to the [facility].





Diagnosis





 Primary Impression:  


 COPD exacerbation


 Additional Impressions:  


 Bronchitis


 Sepsis


 Qualified Codes:  A41.9 - Sepsis, unspecified organism





Admitting Information


Admitting Physician Requests:  Yuly Verdugo MD 2017 04:07

## 2017-11-28 NOTE — EKG
Date Performed: 11/28/2017       Time Performed: 03:10:07

 

PTAGE:      76 years

 

EKG:      SINUS TACHYCARDIA MODERATE ST DEPRESSION When compared to previous tracing, patient is now 
tachycardic. ABNORMAL ECG

 

PREVIOUS TRACING       : 02/06/2017 14.09

 

DOCTOR:   Aissatou Coles  Interpretating Date/Time  11/28/2017 16:34:45

## 2017-11-28 NOTE — RADRPT
EXAM DATE/TIME:  2017 17:20 

 

[facility] COMPARISON:     

No previous studies available for comparison.

 

 

INDICATIONS :     

Shortness of breath past week.

                      

 

IV CONTRAST:     

50 cc Omnipaque 350 (iohexol) IV 

 

 

RADIATION DOSE:     

12.41 CTDIvol (mGy) 

 

 

MEDICAL HISTORY :     

Chronic obstructive pulmonary disease. Carcinoma, breast. 

 

SURGICAL HISTORY :      

 section. 

 

ENCOUNTER:      

Initial

 

ACUITY:      

1 week

 

PAIN SCALE:      

0/10

 

LOCATION:       

Bilateral chest 

 

TECHNIQUE:     

Volumetric scanning of the chest was performed using a pulmonary embolism protocol MIP images were re
constructed.  Using automated exposure control and adjustment of the mA and/or kV according to patien
t size, radiation dose was kept as low as reasonably achievable to obtain optimal diagnostic quality 
images.   DICOM format image data is available electronically for review and comparison.  

 

Follow-up recommendations for detected pulmonary nodules are based at a minimum on nodule size and pa
tient risk factors according to Fleischner Society Guidelines.

 

FINDINGS:     

 

PULMONARY ARTERIES:

No filling defects are seen in the pulmonary arteries through the segmental level.

 

LUNGS:     

There is diffuse fairly symmetric miliary parenchymal lung disease. There is no evidence of lobar con
solidation.

 

PLEURAE:     

There is no pleural thickening or pleural effusion.

 

MEDIASTINUM:     

Mild prominence of central mediastinal and bilateral hilar ilene tissue. Bilateral thyroid nodules. 

 

MUSCULOSKELETAL:     

Within normal limits for patient age.

 

MISCELLANEOUS:     

The visualized upper abdominal organs demonstrate no acute abnormality.

 

CONCLUSION:     

Diffuse miliary parenchymal lung disease. Potentially reactive central mediastinal and bilateral rosalio
 are ilene prominence. Differential considerations would include atypical pneumonias, pneumoconiosis,
 sarcoidosis, metastatic disease.

No evidence of pulmonary embolism.

 

 

 

 

 Sergey Johnson MD on 2017 at 17:47           

Board Certified Radiologist.

 This report was verified electronically.

## 2017-11-28 NOTE — HHI.HP
__________________________________________________





[facility] [place]


Service


[facility]


Primary Care Physician


Evelyn Villalobos MD


Admission Diagnosis





copd exacerbation, bronchitis, sepsis, svt


Diagnoses:  


(1) COPD exacerbation


Diagnosis:  Principal





(2) Hypercapnic respiratory failure


Diagnosis:  Principal





Chief Complaint:  


sob


Travel History


International Travel<30 Days:  No


Contact w/Intl Traveler <30 Da:  No


Traveled to Known Affected Are:  No


History of Present Illness


patient is a 77 y/o female with history of COPD, breast cancer and RA, who 

presented to ER with worsening shortness of breath. she says that it started a 

few days ago. it was associated with cough-productive of yellowish sputum. she 

was seen in ER two days ago and was prescribed prednisone and zithromax and 

discharged home. she says that despite taking the medications, her sob 

continued to get worse to the extent that she decided to come back to the 

hospital. she denies any chest pain, fever or chills.she doesn't use oxygen at 

home. she was placed on BiPaP in ER .





Review of Systems


Constitutional:  DENIES: Fever, Weight loss, Chills, Night Sweats


Eyes:  DENIES: Blurred vision, Diplopia, Vision loss, Double Vision


Ears, nose, mouth, throat:  DENIES: Tinnitus, Vertigo, Throat pain, Epistaxis


Respiratory:  COMPLAINS OF: Cough, Sputum production, Shortness of breath, 

DENIES: Apneas, Snoring, Wheezing, Hemoptysis


Cardiovascular:  DENIES: Chest pain, Palpitations, Syncope, Dyspnea on Exertion

, PND, Lower Extremity Edema, Orthopnea, Claudication


Gastrointestinal:  DENIES: Abdominal pain, Black stools, Bloody stools, 

Constipation, Diarrhea, Nausea, Vomiting, Difficulty Swallowing, Anorexia


Genitourinary:  DENIES: Urinary frequency, Urgency, Hematuria, Dysuria


Musculoskeletal:  DENIES: Joint pain, Muscle aches, Stiffness, Joint Swelling


Integumentary:  DENIES: Rash


Neurologic:  DENIES: Abnormal gait, Headache, Localized weakness, Paresthesias, 

Seizures, Speech Problems, Tremor, Poor Balance


Psychiatric:  DENIES: Anxiety, Confusion, Mood changes, Depression, 

Hallucinations, Agitation, Suicidal Ideation, Homicidal Ideation, Delusions





Past Family Social History


Past Medical History


COPD, breast cancer , rheumatoid arthritis


Past Surgical History


breast lumpectomy, eye surgery.


Reported Medications


Plaquenil, duoneb, paroxetin


Allergies:  


Coded Allergies:  


     shellfish derived (Unverified  Allergy, Severe, Anaphylaxis, 17)


Active Ordered Medications





Current Medications


Albuterol/ Ipratropium (Duoneb Neb) 1 ampule Q15M INH  Last administered on  04:21;  Start 17 at 04:15;  Stop 17 at 04:31;  Status DC


Ceftriaxone Sodium 1000 mg/ Sodium Chloride 100 ml @  200 mls/hr ONCE  ONCE IV  

Last administered on  04:26;  Start 17 at 04:15;  Stop 17 

at 04:44;  Status DC


Azithromycin 500 mg/Sodium Chloride 250 ml @  250 mls/hr ONCE  ONCE IV  Last 

administered on  05:15;  Start 17 at 04:15;  Stop 17 at 05

:14;  Status DC


Sodium Chloride 1,000 ml @  1,000 mls/hr Q1H ONCE IV  Last administered on  04:31;  Start 17 at 04:18;  Stop 17 at 05:17;  Status DC


Sodium Chloride 1,000 ml @  1,000 mls/hr Q1H ONCE IV  Last administered on  05:16;  Start 17 at 04:18;  Stop 17 at 05:17;  Status DC


Sodium Chloride 400 ml @  1,000 mls/hr Q24M ONCE IV  Last administered on  05:16;  Start 17 at 04:18;  Stop 17 at 04:41;  Status DC


Ceftriaxone Sodium 1000 mg/ Sodium Chloride 100 ml @  200 mls/hr Q24H IV ;  

Start 17 at 04:30


Azithromycin 500 mg/Sodium Chloride 250 ml @  250 mls/hr Q24H IV ;  Start  at 05:30


Sodium Chloride (NS Flush) 2 ml BID IV FLUSH ;  Start 17 at 09:00


Sodium Chloride (NS Flush) 2 ml UNSCH  PRN IV FLUSH FLUSH AFTER USING IV ACCESS

;  Start 17 at 05:30


Albuterol/ Ipratropium (Duoneb Neb) 1 ampule Q6HR  NEB INH ;  Start 17 at 

10:00


Albuterol Sulfate (Albuterol Neb) 2.5 mg Q2HR NEB  PRN INH SHORTNESS OF BREATH;

  Start 17 at 05:30


Methylprednisolone Sodium Succinate (SoluMEDROL INJ) 40 mg Q6H IV PUSH  Last 

administered on  06:04;  Start 17 at 05:30


Enoxaparin Sodium (Lovenox Inj) 40 mg Q24H SQ  Last administered on  

06:05;  Start 17 at 05:30


Social History


quit smoking years ago.





Physical Exam


Vital Signs





Vital Signs








  Date Time  Temp Pulse Resp B/P (MAP) Pulse Ox O2 Delivery O2 Flow Rate FiO2


 


17 07:00     98  4.00 


 


17 06:58  97 22 166/72 (103) 100   40


 


17 05:30  112 20 165/77 (106) 99 BiPAP  40


 


17 05:20     98   40


 


17 05:20      BiPAP  40


 


17 05:15  165 22 206/80 (122) 94 Nasal Cannula 2.00 


 


17 05:00  170 22 198/86 (123) 94 Nasal Cannula 2.00 


 


17 03:12  113 16 204/85 (124) 98 Aerosol Mask 6.00 


 


17 03:10   24  98 Aerosol Mask 6.00 


 


17 03:07 98.7 113 28 214/93 (133)    








Physical Exam


GENERAL: with  respiratory distress-still  on BiPaP


SKIN: No rashes, ecchymoses or lesions. Cool and dry.


HEAD: Atraumatic. Normocephalic. No temporal or scalp tenderness.


EYES: Pupils equal round and reactive. Extraocular motions intact. No scleral 

icterus. No injection or drainage. 


ENT: Nose without bleeding, purulent drainage or septal hematoma. Throat 

without erythema, tonsillar hypertrophy or exudate. Uvula midline. Airway 

patent.


NECK: Trachea midline. No JVD or lymphadenopathy. Supple, nontender, no 

meningeal signs.


CARDIOVASCULAR: Regular rate and rhythm without murmurs, gallops, or rubs. 


RESPIRATORY: diminished air entry bilaterally- with bilateral wheezing.


GASTROINTESTINAL: Abdomen soft, non-tender, nondistended. No hepato-splenomegaly

, or palpable masses. No guarding.


MUSCULOSKELETAL: Extremities without clubbing, cyanosis, or edema. No joint 

tenderness, effusion, or edema noted. No calf tenderness. Negative Homans sign 

bilaterally.


NEUROLOGICAL: Awake and alert. Cranial nerves II through XII intact.  Motor and 

sensory grossly within normal limits. Five out of 5 muscle strength in all 

muscle groups.  Normal speech.


Laboratory





Laboratory Tests








Test


  17


03:15 17


04:05 17


05:36


 


White Blood Count 21.8   


 


Red Blood Count 4.73   


 


Hemoglobin 15.4   


 


Hematocrit 45.9   


 


Mean Corpuscular Volume 96.9   


 


Mean Corpuscular Hemoglobin 32.5   


 


Mean Corpuscular Hemoglobin


Concent 33.6 


  


  


 


 


Red Cell Distribution Width 13.0   


 


Platelet Count 244   


 


Mean Platelet Volume 9.2   


 


Neutrophils (%) (Auto) 81.8   


 


Lymphocytes (%) (Auto) 7.0   


 


Monocytes (%) (Auto) 11.0   


 


Eosinophils (%) (Auto) 0.0   


 


Basophils (%) (Auto) 0.2   


 


Neutrophils # (Auto) 17.8   


 


Lymphocytes # (Auto) 1.5   


 


Monocytes # (Auto) 2.4   


 


Eosinophils # (Auto) 0.0   


 


Basophils # (Auto) 0.0   


 


CBC Comment AUTO DIFF   


 


Differential Total Cells


Counted 100 


  


  


 


 


Neutrophils % (Manual) 72   


 


Band Neutrophils % 12   


 


Lymphocytes % 5   


 


Monocytes % 9   


 


Neutrophils # (Manual) 18.7   


 


Metamyelocytes 2   


 


Differential Comment


  FINAL DIFF


MANUAL 


  


 


 


Toxic Granulation 1+   


 


Toxic Vacuolation PRESENT   


 


Platelet Estimate NORMAL   


 


Platelet Morphology Comment NORMAL   


 


Red Cell Morphology Comment NORMAL   


 


Blood Urea Nitrogen 39   


 


Creatinine 0.67   


 


Random Glucose 179   


 


Total Protein 7.1   


 


Albumin 3.3   


 


Calcium Level 9.2   


 


Alkaline Phosphatase 135   


 


Aspartate Amino Transf


(AST/SGOT) 44 


  


  


 


 


Alanine Aminotransferase


(ALT/SGPT) 50 


  


  


 


 


Total Bilirubin 0.5   


 


Sodium Level 141   


 


Potassium Level 3.9   


 


Chloride Level 106   


 


Carbon Dioxide Level 28.2   


 


Anion Gap 7   


 


Estimat Glomerular Filtration


Rate 86 


  


  


 


 


Lactic Acid Level  1.9  


 


Blood Gas Puncture Site   RT RADIAL 


 


Blood Gas Patient Temperature   37.0 


 


Blood Gas HCO3   26 


 


Blood Gas Base Excess   -0.5 


 


Blood Gas Oxygen Saturation   97 


 


Arterial Blood pH   7.26 


 


Arterial Blood Partial


Pressure CO2 


  


  60 


 


 


Arterial Blood Partial


Pressure O2 


  


  126 


 


 


Arterial Blood Oxygen Content   18.6 


 


Arterial Blood


Carboxyhemoglobin 


  


  0.8 


 


 


Arterial Blood Methemoglobin   0.6 


 


Blood Gas Hemoglobin   13.5 


 


Oxygen Delivery Device   BiPAP 


 


Blood Gas Ventilator Setting   IPAP12/EPAP5 


 


Blood Gas Inspired Oxygen   40 














 Date/Time


Source Procedure


Growth Status


 


 


 17 04:15


Blood Peripheral Aerobic Blood Culture


Pending Received


 


 17 04:15


Blood Peripheral Anaerobic Blood Culture


Pending Received








Result Diagram:  


17








Caprini VTE Risk Assessment


Caprini VTE Risk Assessment:  Mod/High Risk (score >= 2)


Caprini Risk Assessment Model











 Point Value = 1          Point Value = 2  Point Value = 3  Point Value = 5


 


Age 41-60


Minor surgery


BMI > 25 kg/m2


Swollen legs


Varicose veins


Pregnancy or postpartum


History of unexplained or recurrent


   spontaneous 


Oral contraceptives or hormone


   replacement


Sepsis (< 1 month)


Serious lung disease, including


   pneumonia (< 1 month)


Abnormal pulmonary function


Acute myocardial infarction


Congestive heart failure (< 1 month)


History of inflammatory bowel disease


Medical patient at bed rest Age 61-74


Arthroscopic surgery


Major open surgery (> 45 min)


Laparoscopic surgery (> 45 min)


Malignancy


Confined to bed (> 72 hours)


Immobilizing plaster cast


Central venous access Age >= 75


History of VTE


Family history of VTE


Factor V Leiden


Prothrombin [id number]


Lupus anticoagulant


Anticardiolipin antibodies


Elevated serum homocysteine


Heparin-induced thrombocytopenia


Other congenital or acquired


   thrombophilia Stroke (< 1 month)


Elective arthroplasty


Hip, pelvis, or leg fracture


Acute spinal cord injury (< 1 month)








Prophylaxis Regimen











   Total Risk


Factor Score Risk Level Prophylaxis Regimen


 


0-1      Low Early ambulation


 


2 Moderate Order ONE of the following:


*Sequential Compression Device (SCD)


*Heparin 5000 units SQ BID


 


3-4 Higher Order ONE of the following medications:


*Heparin 5000 units SQ TID


*Enoxaparin/Lovenox 40 mg SQ daily (WT < 150 kg, CrCl > 30 mL/min)


*Enoxaparin/Lovenox 30 mg SQ daily (WT < 150 kg, CrCl > 10-29 mL/min)


*Enoxaparin/Lovenox 30 mg SQ BID (WT < 150 kg, CrCl > 30 mL/min)


AND/OR


*Sequential Compression Device (SCD)


 


5 or more Highest Order ONE of the following medications:


*Heparin 5000 units SQ TID (Preferred with Epidurals)


*Enoxaparin/Lovenox 40 mg SQ daily (WT < 150 kg, CrCl > 30 mL/min)


*Enoxaparin/Lovenox 30 mg SQ daily (WT < 150 kg, CrCl > 10-29 mL/min)


*Enoxaparin/Lovenox 30 mg SQ BID (WT < 150 kg, CrCl > 30 mL/min)


AND


*Sequential Compression Device (SCD)











Assessment and Plan


Assessment and Plan


A/P  





- acute on chronic hypercapnic respiratory failure / respiratory acidosis/ due 

to COPD exacerbation


 continue with BiPaP- repeat ABG stat- continue neb treatment; scheduled and prn

- continue IV antibiotics and IV steroid.


 will consult pulmonary.


-tachycardia- due to COPD exacerbation- monitor on telemetry.


-leukocytosis with bandemia-  continue antibiotics and monitor temps- CBC in am.


-history of RA/ breast cancer- f/u as outpatient.


-DVT prophylaxis; with subq Lovenox


-consult PT when stable.








patient with respiratory distress on BiPaP.


close monitoring of the clinical response to BiPaP along with close f/u on her 

ABG.


low threshold to transfer to ICU if her clinical condition doesn't improve with 

BiPaP and current care.


intubation was d/w the patient and family and she agreed in case it becomes 

necessary.


awaiting pulmonary evaluation.


d/w the RN and RT.





critical care time 35 min.


Discussed Condition With


the patient, her daughter and RN.





Physician Certification


2 Midnight Certification Type:  Admission for Inpatient Services


Order for Inpatient Services


The services are ordered in accordance with Medicare regulations or non-

Medicare payer requirements, as applicable.  In the case of services not 

specified as inpatient-only, they are appropriately provided as inpatient 

services in accordance with the 2-midnight benchmark.


Estimated LOS (days):  3


 days is the estimated time the patient will need to remain in the hospital, 

assuming treatment plan goals are met and no additional complications.


Post-Hospital Plan:  Not yet determined





Problem Qualifiers





(1) Hypercapnic respiratory failure:  


Qualified Codes:  J96.22 - Acute and chronic respiratory failure with 

hypercapnia








Hawa Paredes MD 2017 08:45

## 2017-11-28 NOTE — HHI.PR
Addendum To [facility] Progress Not


Reason for addendum:  Additonal documentation (case was d/w ; patient 

will be transferred to ICU with intensivist consult.)











Hawa Paredes MD Nov 28, 2017 13:07

## 2017-11-29 VITALS
HEART RATE: 77 BPM | RESPIRATION RATE: 30 BRPM | DIASTOLIC BLOOD PRESSURE: 69 MMHG | TEMPERATURE: 98 F | SYSTOLIC BLOOD PRESSURE: 173 MMHG | OXYGEN SATURATION: 98 %

## 2017-11-29 VITALS — HEART RATE: 75 BPM

## 2017-11-29 VITALS
SYSTOLIC BLOOD PRESSURE: 163 MMHG | RESPIRATION RATE: 25 BRPM | HEART RATE: 82 BPM | DIASTOLIC BLOOD PRESSURE: 71 MMHG | OXYGEN SATURATION: 97 % | TEMPERATURE: 98.4 F

## 2017-11-29 VITALS
OXYGEN SATURATION: 98 % | SYSTOLIC BLOOD PRESSURE: 134 MMHG | HEART RATE: 74 BPM | TEMPERATURE: 98 F | DIASTOLIC BLOOD PRESSURE: 64 MMHG | RESPIRATION RATE: 31 BRPM

## 2017-11-29 VITALS
TEMPERATURE: 98.6 F | RESPIRATION RATE: 21 BRPM | OXYGEN SATURATION: 100 % | HEART RATE: 74 BPM | SYSTOLIC BLOOD PRESSURE: 180 MMHG | DIASTOLIC BLOOD PRESSURE: 67 MMHG

## 2017-11-29 VITALS
TEMPERATURE: 97.7 F | DIASTOLIC BLOOD PRESSURE: 78 MMHG | HEART RATE: 75 BPM | SYSTOLIC BLOOD PRESSURE: 184 MMHG | OXYGEN SATURATION: 100 % | RESPIRATION RATE: 25 BRPM

## 2017-11-29 VITALS — HEART RATE: 84 BPM

## 2017-11-29 VITALS — OXYGEN SATURATION: 99 %

## 2017-11-29 VITALS — OXYGEN SATURATION: 98 %

## 2017-11-29 VITALS — HEART RATE: 70 BPM

## 2017-11-29 VITALS — OXYGEN SATURATION: 98 % | RESPIRATION RATE: 32 BRPM | HEART RATE: 78 BPM | TEMPERATURE: 97.8 F

## 2017-11-29 VITALS — HEART RATE: 67 BPM

## 2017-11-29 VITALS — HEART RATE: 85 BPM

## 2017-11-29 VITALS — OXYGEN SATURATION: 97 %

## 2017-11-29 LAB
ALP SERPL-CCNC: 115 U/L (ref 45–117)
ALT SERPL-CCNC: 42 U/L (ref 10–53)
ANION GAP SERPL CALC-SCNC: 6 MEQ/L (ref 5–15)
AST SERPL-CCNC: 28 U/L (ref 15–37)
BASOPHILS # BLD AUTO: 0 TH/MM3 (ref 0–0.2)
BASOPHILS NFR BLD: 0 % (ref 0–2)
BILIRUB SERPL-MCNC: 0.3 MG/DL (ref 0.2–1)
BUN SERPL-MCNC: 26 MG/DL (ref 7–18)
CHLORIDE SERPL-SCNC: 104 MEQ/L (ref 98–107)
EOSINOPHIL # BLD: 0 TH/MM3 (ref 0–0.4)
EOSINOPHIL NFR BLD: 0 % (ref 0–4)
ERYTHROCYTE [DISTWIDTH] IN BLOOD BY AUTOMATED COUNT: 12.9 % (ref 11.6–17.2)
GFR SERPLBLD BASED ON 1.73 SQ M-ARVRAT: 135 ML/MIN (ref 89–?)
HCO3 BLD-SCNC: 31.7 MEQ/L (ref 21–32)
HCT VFR BLD CALC: 42.1 % (ref 35–46)
HEMO FLAGS: (no result)
LYMPHOCYTES # BLD AUTO: 0.5 TH/MM3 (ref 1–4.8)
LYMPHOCYTES NFR BLD AUTO: 3.2 % (ref 9–44)
MCH RBC QN AUTO: 32.8 PG (ref 27–34)
MCHC RBC AUTO-ENTMCNC: 33.5 % (ref 32–36)
MCV RBC AUTO: 97.8 FL (ref 80–100)
METAMYELOCYTES NFR BLD: 1 % (ref 0–1)
MONOCYTES NFR BLD: 8.7 % (ref 0–8)
MYELOCYTES NFR BLD: 2 % (ref 0–0)
NEUTROPHILS # BLD AUTO: 12.8 TH/MM3 (ref 1.8–7.7)
NEUTROPHILS NFR BLD AUTO: 88.1 % (ref 16–70)
NEUTS BAND # BLD MANUAL: 12.1 TH/MM3 (ref 1.8–7.7)
NEUTS BAND NFR BLD: 15 % (ref 0–6)
NEUTS SEG NFR BLD MANUAL: 65 % (ref 16–70)
PLAT MORPH BLD: NORMAL
PLATELET # BLD: 194 TH/MM3 (ref 150–450)
PLATELET BLD QL SMEAR: NORMAL
POTASSIUM SERPL-SCNC: 4 MEQ/L (ref 3.5–5.1)
RBC # BLD AUTO: 4.3 MIL/MM3 (ref 4–5.3)
RBC MORPH BLD: NORMAL
SCAN/DIFF: (no result)
SODIUM SERPL-SCNC: 142 MEQ/L (ref 136–145)
TOXIC GRANULES BLD QL SMEAR: (no result)
WBC # BLD AUTO: 14.6 TH/MM3 (ref 4–11)
WBC DIFF SAMPLE: 100

## 2017-11-29 RX ADMIN — FAMOTIDINE SCH MG: 10 INJECTION, SOLUTION INTRAVENOUS at 08:18

## 2017-11-29 RX ADMIN — HUMAN INSULIN SCH: 100 INJECTION, SOLUTION SUBCUTANEOUS at 14:52

## 2017-11-29 RX ADMIN — BUDESONIDE AND FORMOTEROL FUMARATE DIHYDRATE SCH PUFF: 160; 4.5 AEROSOL RESPIRATORY (INHALATION) at 20:20

## 2017-11-29 RX ADMIN — AZITHROMYCIN SCH MLS/HR: 500 INJECTION, POWDER, LYOPHILIZED, FOR SOLUTION INTRAVENOUS at 04:37

## 2017-11-29 RX ADMIN — HUMAN INSULIN SCH: 100 INJECTION, SOLUTION SUBCUTANEOUS at 07:30

## 2017-11-29 RX ADMIN — DILTIAZEM HYDROCHLORIDE SCH MG: 60 TABLET, FILM COATED ORAL at 04:37

## 2017-11-29 RX ADMIN — HUMAN INSULIN SCH: 100 INJECTION, SOLUTION SUBCUTANEOUS at 19:30

## 2017-11-29 RX ADMIN — FAMOTIDINE SCH MG: 10 INJECTION, SOLUTION INTRAVENOUS at 20:20

## 2017-11-29 RX ADMIN — TIOTROPIUM BROMIDE SCH MCG: 18 CAPSULE ORAL; RESPIRATORY (INHALATION) at 09:15

## 2017-11-29 RX ADMIN — IPRATROPIUM BROMIDE AND ALBUTEROL SULFATE SCH AMPULE: .5; 3 SOLUTION RESPIRATORY (INHALATION) at 19:38

## 2017-11-29 RX ADMIN — DILTIAZEM HYDROCHLORIDE SCH MG: 60 TABLET, FILM COATED ORAL at 22:19

## 2017-11-29 RX ADMIN — IPRATROPIUM BROMIDE AND ALBUTEROL SULFATE SCH AMPULE: .5; 3 SOLUTION RESPIRATORY (INHALATION) at 04:11

## 2017-11-29 RX ADMIN — PHENYTOIN SODIUM SCH MLS/HR: 50 INJECTION INTRAMUSCULAR; INTRAVENOUS at 15:01

## 2017-11-29 RX ADMIN — IPRATROPIUM BROMIDE AND ALBUTEROL SULFATE SCH AMPULE: .5; 3 SOLUTION RESPIRATORY (INHALATION) at 11:53

## 2017-11-29 RX ADMIN — Medication SCH ML: at 08:20

## 2017-11-29 RX ADMIN — DILTIAZEM HYDROCHLORIDE SCH MG: 60 TABLET, FILM COATED ORAL at 17:05

## 2017-11-29 RX ADMIN — IPRATROPIUM BROMIDE AND ALBUTEROL SULFATE SCH AMPULE: .5; 3 SOLUTION RESPIRATORY (INHALATION) at 09:50

## 2017-11-29 RX ADMIN — HUMAN INSULIN SCH: 100 INJECTION, SOLUTION SUBCUTANEOUS at 00:05

## 2017-11-29 RX ADMIN — Medication SCH ML: at 20:20

## 2017-11-29 RX ADMIN — ENOXAPARIN SODIUM SCH MG: 40 INJECTION SUBCUTANEOUS at 04:38

## 2017-11-29 RX ADMIN — DILTIAZEM HYDROCHLORIDE SCH MG: 60 TABLET, FILM COATED ORAL at 11:31

## 2017-11-29 RX ADMIN — BUDESONIDE AND FORMOTEROL FUMARATE DIHYDRATE SCH PUFF: 160; 4.5 AEROSOL RESPIRATORY (INHALATION) at 09:15

## 2017-11-29 RX ADMIN — IPRATROPIUM BROMIDE AND ALBUTEROL SULFATE SCH AMPULE: .5; 3 SOLUTION RESPIRATORY (INHALATION) at 23:52

## 2017-11-29 RX ADMIN — SODIUM CHLORIDE SCH MLS/HR: 900 INJECTION INTRAVENOUS at 03:50

## 2017-11-29 RX ADMIN — IPRATROPIUM BROMIDE AND ALBUTEROL SULFATE SCH AMPULE: .5; 3 SOLUTION RESPIRATORY (INHALATION) at 15:20

## 2017-11-29 NOTE — HHI.CCPN
Subjective


Remarks/Hospital Course


The patient is a 76-year-old female with a past medical history of chronic 

obstructive pulmonary disease being followed by Dr. Oglesby her outpatient 

pulmonologist, for breast cancer, rheumatoid arthritis on Plaquenil who 

presented to [facility] emergency department early this morning 

with a 1-week history of progressive worsening shortness of breath, associated 

with productive cough.


The patient denies any constitutional symptoms.  She was seen in the ED 2 days 

ago and was given prescription for prednisone and Zithromax and discharged 

home.  Due to worsening of her symptoms see lactate to come back for further 

evaluation and management of her symptoms. The patient states that she just 

came back from a 1-week cruise.  She denies any associated symptoms of chest 

pain, orthopnea, PND or edema of lower extremities.  The patient denies any use 

of home oxygen or any prior history of intubations.  She is on nebulizers and 

bronchodilators at home in addition to prednisone. She was placed on BiPAP 12/ 05 with 40% FIO2 and her initial ABG showed acute hypercapnic respiratory 

acidosis with a pH of 7.26, CO2 60, pAO2 126, bicarb 26, sats 97%.  The patient 

had multiple blood gases since then without any significant changes in her 

respiratory acidosis.


 


Her laboratory data significant for leukocytosis with a WBC of 21.8 and lactic 

acid level was measured at 2.1.  A chest x-ray on arrival showed no evidence of 

any acute disease. The patient was seen by Dr. Oglesby from a pulmonary service.  

She is being transferred to [facility] for closer observation.  Critical care medicine 

was consulted for critical care and management. When seen the patient was 

sitting up in bed on BiPap 15/5 with 30% FIO2.





11/29: Patient is currently off BiPAP slightly tachypneic.  Bilateral wheezing 

on exam.  Patient is able to talk almost in full sentences.  She informs me 

that Dr. Oglesby has just rounded





Objective





Vital Signs








  Date Time  Temp Pulse Resp B/P (MAP) Pulse Ox O2 Delivery O2 Flow Rate FiO2


 


11/29/17 10:00  75      


 


11/29/17 09:51     97 Nasal Cannula 4.00 


 


11/29/17 08:00 97.8  32     


 


11/29/17 04:00    180/67 (104)    


 


11/29/17 01:12        30














Intake and Output   


 


 11/29/17 11/29/17 11/30/17





 08:00 16:00 00:00


 


Intake Total 770 ml  


 


Output Total 550 ml  


 


Balance 220 ml  








Result Diagram:  


11/29/17 0614                                                                  

              11/29/17 0614





Other Results





Laboratory Tests








Test


  11/28/17


11:35 11/28/17


15:10


 


Blood Gas Puncture Site LT RADIAL  RT RADIAL 


 


Blood Gas Patient Temperature 98.6  98.6 


 


Blood Gas HCO3


  28 mmol/L


(22-26) 29 mmol/L


(22-26)


 


Blood Gas Base Excess


  1.2 mmol/L


(-2-2) 4.1 mmol/L


(-2-2)


 


Blood Gas Oxygen Saturation 92 % ()  95 % () 


 


Arterial Blood pH


  7.24


(7.380-7.420) 7.35


(7.380-7.420)


 


Arterial Blood Partial


Pressure CO2 67 mmHg


(38-42) 55 mmHg


(38-42)


 


Arterial Blood Partial


Pressure O2 77 mmHg


() 92 mmHg


()


 


Arterial Blood Oxygen Content


  18.1 Vol %


(12.0-20.0) 18.2 Vol %


(12.0-20.0)


 


Arterial Blood


Carboxyhemoglobin 0.9 % (0-4) 


  0.7 % (0-4) 


 


 


Arterial Blood Methemoglobin 0.9 % (0-2)  1.1 % (0-2) 


 


Blood Gas Hemoglobin


  14.0 G/DL


(12.0-16.0) 13.5 G/DL


(12.0-16.0)


 


Oxygen Delivery Device BIPAP  BIPAP 


 


Blood Gas Ventilator Setting IPAP15,EPAP5  IPAP15 EPAP5 


 


Blood Gas Inspired Oxygen 30 %  30 % 








Objective Remarks


GEN:  Sitting up in the bed.  Currently off BiPAP. 


HEENT: Oral mucosa is dry. Neck veins are not grossly distended.


LUNGS: Diffuse wheezing in both lungs with prolonged expiration.  Mild tachypnea


HEART: Sinus tachycardia, no murmur


ABDOMEN: Abdomen is obese but soft.


EXTREMITIES:  She has very minimal lower extremity edema.  No calf tenderness.


NEURO: Alert awake oriented no focal deficits


Urinary Catheter:  Yes


Assessment to:  Continue





A/P


Assessment and Plan


IMPRESSION


1.  Acute hypercapnic respiratory failure.


2.  Chronic obstructive pulmonary disease exacerbation.


3.  Leukocytosis with bandemia.


4.  History of rheumatoid arthritis on Plaquenil.


5.  Breast CA status post radiation treatment and lumpectomy.


6.  Hypertension.


 


RECOMMENDATIONS


1.  Monitor neuro status closely and avoid any sedatives.


2.  Continue with oxygen maintain sat above 92%.


3.  Bronchodilators in the form of DuoNeb q. 4+ q. 2 p.r.n. for shortness of 

breath.


4.  Continue with IV steroids. Pulm Dr. Oglesby following.


5.  Noninvasive positive pressure ventilation.  Will repeat ABG.  if there is 

any worsening, will need intubation and mechanical ventilation.


6.  CT pulmonary angiogram of the chest -diffuse miliary disease ? atypical 

pneumonia


7.  Chronic lung disease in the seen in the setting of Plaquenil use.


8.  Monitor heart rate and blood pressure closely and maintain MAP greater 65 

mmHg.


9.  NS at 50 an hour.


10. Monitor renal function Is and Os and electrolyte replacement per protocol.


11. Regular diet, Pepcid 10 mg IV q. 12.


12. Continue with Rocephin and Zithromax 


13.  Follow up on blood culture 


14.  GI prophylaxis will place on Pepcid 10 mg q. 12


15.  DVT prophylaxis with SCDs Lovenox 40 mg Subcu daily.


16.  The case discussed with Dr. Oglesby pulmonary service.











Ana Rincon MD Nov 29, 2017 11:37

## 2017-11-30 VITALS — HEART RATE: 72 BPM

## 2017-11-30 VITALS
SYSTOLIC BLOOD PRESSURE: 153 MMHG | OXYGEN SATURATION: 95 % | RESPIRATION RATE: 22 BRPM | DIASTOLIC BLOOD PRESSURE: 67 MMHG | TEMPERATURE: 97.7 F | HEART RATE: 70 BPM

## 2017-11-30 VITALS
HEART RATE: 74 BPM | SYSTOLIC BLOOD PRESSURE: 174 MMHG | OXYGEN SATURATION: 100 % | DIASTOLIC BLOOD PRESSURE: 78 MMHG | RESPIRATION RATE: 37 BRPM | TEMPERATURE: 98 F

## 2017-11-30 VITALS
TEMPERATURE: 98.3 F | SYSTOLIC BLOOD PRESSURE: 166 MMHG | DIASTOLIC BLOOD PRESSURE: 72 MMHG | RESPIRATION RATE: 32 BRPM | OXYGEN SATURATION: 99 % | HEART RATE: 68 BPM

## 2017-11-30 VITALS — HEART RATE: 70 BPM

## 2017-11-30 VITALS
TEMPERATURE: 98.9 F | HEART RATE: 73 BPM | RESPIRATION RATE: 20 BRPM | DIASTOLIC BLOOD PRESSURE: 77 MMHG | SYSTOLIC BLOOD PRESSURE: 169 MMHG | OXYGEN SATURATION: 100 %

## 2017-11-30 VITALS — HEART RATE: 79 BPM

## 2017-11-30 VITALS
RESPIRATION RATE: 28 BRPM | DIASTOLIC BLOOD PRESSURE: 79 MMHG | TEMPERATURE: 97.9 F | SYSTOLIC BLOOD PRESSURE: 166 MMHG | OXYGEN SATURATION: 96 % | HEART RATE: 69 BPM

## 2017-11-30 VITALS — HEART RATE: 74 BPM

## 2017-11-30 VITALS
HEART RATE: 71 BPM | DIASTOLIC BLOOD PRESSURE: 69 MMHG | SYSTOLIC BLOOD PRESSURE: 162 MMHG | RESPIRATION RATE: 21 BRPM | OXYGEN SATURATION: 100 % | TEMPERATURE: 98 F

## 2017-11-30 VITALS — OXYGEN SATURATION: 97 %

## 2017-11-30 VITALS — OXYGEN SATURATION: 99 %

## 2017-11-30 LAB
ALP SERPL-CCNC: 107 U/L (ref 45–117)
ALT SERPL-CCNC: 31 U/L (ref 10–53)
ANION GAP SERPL CALC-SCNC: 7 MEQ/L (ref 5–15)
AST SERPL-CCNC: 22 U/L (ref 15–37)
BASOPHILS # BLD AUTO: 0 TH/MM3 (ref 0–0.2)
BASOPHILS NFR BLD: 0.1 % (ref 0–2)
BILIRUB SERPL-MCNC: 0.3 MG/DL (ref 0.2–1)
BUN SERPL-MCNC: 25 MG/DL (ref 7–18)
CHLORIDE SERPL-SCNC: 100 MEQ/L (ref 98–107)
EOSINOPHIL # BLD: 0 TH/MM3 (ref 0–0.4)
EOSINOPHIL NFR BLD: 0 % (ref 0–4)
ERYTHROCYTE [DISTWIDTH] IN BLOOD BY AUTOMATED COUNT: 13 % (ref 11.6–17.2)
GFR SERPLBLD BASED ON 1.73 SQ M-ARVRAT: 103 ML/MIN (ref 89–?)
HCO3 BLD-SCNC: 32.2 MEQ/L (ref 21–32)
HCT VFR BLD CALC: 40.4 % (ref 35–46)
HEMO FLAGS: (no result)
LYMPHOCYTES # BLD AUTO: 0.3 TH/MM3 (ref 1–4.8)
LYMPHOCYTES NFR BLD AUTO: 2.3 % (ref 9–44)
MCH RBC QN AUTO: 32.8 PG (ref 27–34)
MCHC RBC AUTO-ENTMCNC: 34 % (ref 32–36)
MCV RBC AUTO: 96.4 FL (ref 80–100)
METAMYELOCYTES NFR BLD: 5 % (ref 0–1)
MONOCYTES NFR BLD: 7.5 % (ref 0–8)
NEUTROPHILS # BLD AUTO: 13.7 TH/MM3 (ref 1.8–7.7)
NEUTROPHILS NFR BLD AUTO: 90.1 % (ref 16–70)
NEUTS BAND # BLD MANUAL: 14.4 TH/MM3 (ref 1.8–7.7)
NEUTS BAND NFR BLD: 11 % (ref 0–6)
NEUTS SEG NFR BLD MANUAL: 79 % (ref 16–70)
PLASMA CELLS NFR BLD: 1 % (ref 0–0)
PLAT MORPH BLD: NORMAL
PLATELET # BLD: 174 TH/MM3 (ref 150–450)
PLATELET BLD QL SMEAR: NORMAL
POTASSIUM SERPL-SCNC: 3.8 MEQ/L (ref 3.5–5.1)
RBC # BLD AUTO: 4.19 MIL/MM3 (ref 4–5.3)
SCAN/DIFF: (no result)
SODIUM SERPL-SCNC: 139 MEQ/L (ref 136–145)
WBC # BLD AUTO: 15.2 TH/MM3 (ref 4–11)
WBC DIFF SAMPLE: 100

## 2017-11-30 RX ADMIN — IPRATROPIUM BROMIDE AND ALBUTEROL SULFATE SCH AMPULE: .5; 3 SOLUTION RESPIRATORY (INHALATION) at 20:38

## 2017-11-30 RX ADMIN — FAMOTIDINE SCH MG: 10 INJECTION, SOLUTION INTRAVENOUS at 09:12

## 2017-11-30 RX ADMIN — DILTIAZEM HYDROCHLORIDE SCH MG: 60 TABLET, FILM COATED ORAL at 12:24

## 2017-11-30 RX ADMIN — FAMOTIDINE SCH MG: 20 TABLET, FILM COATED ORAL at 20:17

## 2017-11-30 RX ADMIN — IPRATROPIUM BROMIDE AND ALBUTEROL SULFATE SCH AMPULE: .5; 3 SOLUTION RESPIRATORY (INHALATION) at 12:37

## 2017-11-30 RX ADMIN — DILTIAZEM HYDROCHLORIDE SCH MG: 60 TABLET, FILM COATED ORAL at 16:35

## 2017-11-30 RX ADMIN — BUDESONIDE AND FORMOTEROL FUMARATE DIHYDRATE SCH PUFF: 160; 4.5 AEROSOL RESPIRATORY (INHALATION) at 20:16

## 2017-11-30 RX ADMIN — HUMAN INSULIN SCH: 100 INJECTION, SOLUTION SUBCUTANEOUS at 07:30

## 2017-11-30 RX ADMIN — Medication SCH ML: at 09:12

## 2017-11-30 RX ADMIN — ENOXAPARIN SODIUM SCH MG: 40 INJECTION SUBCUTANEOUS at 05:30

## 2017-11-30 RX ADMIN — TIOTROPIUM BROMIDE SCH MCG: 18 CAPSULE ORAL; RESPIRATORY (INHALATION) at 09:11

## 2017-11-30 RX ADMIN — HUMAN INSULIN SCH: 100 INJECTION, SOLUTION SUBCUTANEOUS at 00:35

## 2017-11-30 RX ADMIN — DILTIAZEM HYDROCHLORIDE SCH MG: 60 TABLET, FILM COATED ORAL at 04:14

## 2017-11-30 RX ADMIN — HUMAN INSULIN SCH: 100 INJECTION, SOLUTION SUBCUTANEOUS at 12:44

## 2017-11-30 RX ADMIN — AZITHROMYCIN SCH MLS/HR: 500 INJECTION, POWDER, LYOPHILIZED, FOR SOLUTION INTRAVENOUS at 05:30

## 2017-11-30 RX ADMIN — SODIUM CHLORIDE SCH MLS/HR: 900 INJECTION INTRAVENOUS at 04:14

## 2017-11-30 RX ADMIN — BUDESONIDE AND FORMOTEROL FUMARATE DIHYDRATE SCH PUFF: 160; 4.5 AEROSOL RESPIRATORY (INHALATION) at 09:11

## 2017-11-30 RX ADMIN — IPRATROPIUM BROMIDE AND ALBUTEROL SULFATE SCH AMPULE: .5; 3 SOLUTION RESPIRATORY (INHALATION) at 16:31

## 2017-11-30 RX ADMIN — Medication SCH ML: at 20:16

## 2017-11-30 RX ADMIN — HUMAN INSULIN SCH: 100 INJECTION, SOLUTION SUBCUTANEOUS at 19:30

## 2017-11-30 RX ADMIN — IPRATROPIUM BROMIDE AND ALBUTEROL SULFATE SCH AMPULE: .5; 3 SOLUTION RESPIRATORY (INHALATION) at 03:10

## 2017-11-30 RX ADMIN — IPRATROPIUM BROMIDE AND ALBUTEROL SULFATE SCH AMPULE: .5; 3 SOLUTION RESPIRATORY (INHALATION) at 09:31

## 2017-11-30 NOTE — HHI.CCPN
Subjective


Remarks/Hospital Course


The patient is a 76-year-old female with a past medical history of chronic 

obstructive pulmonary disease being followed by Dr. Oglesby her outpatient 

pulmonologist, for breast cancer, rheumatoid arthritis on Plaquenil who 

presented to [facility] emergency department early this morning 

with a 1-week history of progressive worsening shortness of breath, associated 

with productive cough.


The patient denies any constitutional symptoms.  She was seen in the ED 2 days 

ago and was given prescription for prednisone and Zithromax and discharged 

home.  Due to worsening of her symptoms see lactate to come back for further 

evaluation and management of her symptoms. The patient states that she just 

came back from a 1-week cruise.  She denies any associated symptoms of chest 

pain, orthopnea, PND or edema of lower extremities.  The patient denies any use 

of home oxygen or any prior history of intubations.  She is on nebulizers and 

bronchodilators at home in addition to prednisone. She was placed on BiPAP 12/ 05 with 40% FIO2 and her initial ABG showed acute hypercapnic respiratory 

acidosis with a pH of 7.26, CO2 60, pAO2 126, bicarb 26, sats 97%.  The patient 

had multiple blood gases since then without any significant changes in her 

respiratory acidosis.


 


Her laboratory data significant for leukocytosis with a WBC of 21.8 and lactic 

acid level was measured at 2.1.  A chest x-ray on arrival showed no evidence of 

any acute disease. The patient was seen by Dr. Oglesby from a pulmonary service.  

[place] [facility].  Critical care medicine 

was consulted for critical care and management. When seen the patient was 

sitting up in bed on BiPap 15/5 with 30% FIO2.





11/29: Patient is currently off BiPAP slightly tachypneic.  Bilateral wheezing 

on exam.  Patient is able to talk almost in full sentences.  She informs me 

that Dr. Oglesby has just rounded





11/30: Shortness of breath since improved subjectively, sitting up in chair 

able to talk in full sentences.  Good oxygen saturation on nasal cannula chest x

-ray no acute disease





Objective





Vital Signs








  Date Time  Temp Pulse Resp B/P (MAP) Pulse Ox O2 Delivery O2 Flow Rate FiO2


 


11/30/17 10:00  72      


 


11/30/17 08:48     97 Nasal Cannula 3.00 


 


11/30/17 08:00 98.3  32 166/72 (103)    


 


11/29/17 01:12        30














Intake and Output   


 


 11/30/17 11/30/17 12/1/17





 08:00 16:00 00:00


 


Intake Total 1480 ml  


 


Balance 1480 ml  








Result Diagram:  


11/30/17 0605                                                                  

              11/30/17 0605





Other Results





Microbiology








 Date/Time


Source Procedure


Growth Status


 


 


 11/29/17 16:00


Nasal Aspirate Influenza Types A,B Antigen (LAY) - Final


NEGATIVE FOR FLU A AND B ANTIGEN.... Complete


 


 11/29/17 17:30


Urine Random Urine Streptococcus pneumoniae Antigen (M - Final


PRESUMPTIVE NEGATIVE FOR STREPTOCOCCU... Complete








Objective Remarks


GEN:  Sitting up in chair on NC


HEENT: Oral mucosa is dry. Neck veins are not grossly distended.


LUNGS: Mild expiratory wheezing and few coarse crackles. 


HEART: S1-S2 normal no murmurs


ABDOMEN: Abdomen is obese but soft.


EXTREMITIES:  Minimal lower extremity edema.  No calf tenderness.


NEURO: Alert awake oriented no focal deficits





A/P


Assessment and Plan


IMPRESSION


Acute hypercapnic respiratory failure.


Chronic obstructive pulmonary disease exacerbation.


Leukocytosis with bandemia.


History of rheumatoid arthritis on Plaquenil.


Breast CA status post radiation treatment and lumpectomy.


Hypertension.


History of recent retinal detachment surgery


 


RECOMMENDATIONS


1.  Monitor neuro status closely and avoid any sedatives.


2.  Continue with oxygen maintain sat above 92%.


3.  DuoNeb q. 4+ q. 2 p.r.n. for shortness of breath.


4.  Continue with IV steroids. Pulm Dr. Oglesby


5.  Noninvasive positive pressure ventilation as needed.  


6.  CT pulmonary angiogram of the chest -diffuse miliary disease ? atypical 

pneumonia vs ILD RA associated. Defer to pulmonology


7.  Chronic lung disease in the seen in the setting of Plaquenil use.


8.  Monitor heart rate and blood pressure closely and maintain MAP greater 65 

mmHg.


9.  NS at 50 an hour- DC


10. Monitor renal function Is and Os and electrolyte replacement per protocol.


11. Regular diet, Pepcid 10 mg IV q. 12- change to PO


12. Continue with Rocephin and Zithromax 


13.  DVT prophylaxis with SCDs Lovenox 40 mg Subcu daily.


14.  The case discussed with Dr. Oglesby pulmonary service.


 


 Level 2. Transfer to Med Surg.  Patient is under the service of Dr. Huff; I 

have discussed with him to take over tomorrow











Ana Rincon MD Nov 30, 2017 11:34

## 2017-11-30 NOTE — RADRPT
EXAM DATE/TIME:  2017 04:35 

 

[facility] COMPARISON:     

CHEST SINGLE AP, 2017, 3:22.

 

                     

INDICATIONS :     

Shortness of breath, possible pulmonary disease.

                     

 

MEDICAL HISTORY :     

Chronic obstructive pulmonary disease.  Carcinoma, breast.        

 

SURGICAL HISTORY :     

 section.   

 

ENCOUNTER:     

Subsequent                                        

 

ACUITY:     

1 week      

 

PAIN SCORE:     

0/10

 

LOCATION:     

Bilateral chest 

 

FINDINGS:     

A single view of the chest demonstrates the lungs to be symmetrically aerated without evidence of mas
s, infiltrate or effusion.  The cardiomediastinal contours are unremarkable.  Osseous structures are 
intact. There is a peripherally calcified mass with surrounding clips seen in the right lateral lower
 chest. This could be an area of fat necrosis and postoperative change.

 

CONCLUSION:     No acute disease.  

 

 

 

 Sergey Harley MD on 2017 at 5:44           

Board Certified Radiologist.

 This report was verified electronically.

## 2017-12-01 VITALS
RESPIRATION RATE: 20 BRPM | TEMPERATURE: 98.3 F | SYSTOLIC BLOOD PRESSURE: 195 MMHG | DIASTOLIC BLOOD PRESSURE: 84 MMHG | OXYGEN SATURATION: 96 % | HEART RATE: 78 BPM

## 2017-12-01 VITALS — DIASTOLIC BLOOD PRESSURE: 80 MMHG | SYSTOLIC BLOOD PRESSURE: 172 MMHG

## 2017-12-01 VITALS
HEART RATE: 82 BPM | OXYGEN SATURATION: 96 % | SYSTOLIC BLOOD PRESSURE: 176 MMHG | DIASTOLIC BLOOD PRESSURE: 85 MMHG | TEMPERATURE: 98.3 F | RESPIRATION RATE: 19 BRPM

## 2017-12-01 VITALS
RESPIRATION RATE: 19 BRPM | SYSTOLIC BLOOD PRESSURE: 121 MMHG | DIASTOLIC BLOOD PRESSURE: 68 MMHG | HEART RATE: 85 BPM | OXYGEN SATURATION: 95 % | TEMPERATURE: 97.4 F

## 2017-12-01 VITALS
RESPIRATION RATE: 20 BRPM | TEMPERATURE: 98 F | OXYGEN SATURATION: 92 % | DIASTOLIC BLOOD PRESSURE: 72 MMHG | HEART RATE: 86 BPM | SYSTOLIC BLOOD PRESSURE: 163 MMHG

## 2017-12-01 VITALS
HEART RATE: 79 BPM | SYSTOLIC BLOOD PRESSURE: 160 MMHG | TEMPERATURE: 96.4 F | DIASTOLIC BLOOD PRESSURE: 84 MMHG | OXYGEN SATURATION: 96 % | RESPIRATION RATE: 18 BRPM

## 2017-12-01 VITALS — HEART RATE: 83 BPM

## 2017-12-01 VITALS — OXYGEN SATURATION: 96 %

## 2017-12-01 VITALS
TEMPERATURE: 97.4 F | OXYGEN SATURATION: 95 % | DIASTOLIC BLOOD PRESSURE: 67 MMHG | SYSTOLIC BLOOD PRESSURE: 149 MMHG | HEART RATE: 84 BPM | RESPIRATION RATE: 19 BRPM

## 2017-12-01 VITALS — HEART RATE: 91 BPM

## 2017-12-01 VITALS — HEART RATE: 84 BPM

## 2017-12-01 RX ADMIN — IPRATROPIUM BROMIDE AND ALBUTEROL SULFATE SCH AMPULE: .5; 3 SOLUTION RESPIRATORY (INHALATION) at 20:07

## 2017-12-01 RX ADMIN — IPRATROPIUM BROMIDE AND ALBUTEROL SULFATE SCH AMPULE: .5; 3 SOLUTION RESPIRATORY (INHALATION) at 00:55

## 2017-12-01 RX ADMIN — IPRATROPIUM BROMIDE AND ALBUTEROL SULFATE SCH AMPULE: .5; 3 SOLUTION RESPIRATORY (INHALATION) at 17:23

## 2017-12-01 RX ADMIN — BUDESONIDE AND FORMOTEROL FUMARATE DIHYDRATE SCH PUFF: 160; 4.5 AEROSOL RESPIRATORY (INHALATION) at 09:00

## 2017-12-01 RX ADMIN — DILTIAZEM HYDROCHLORIDE SCH MG: 60 TABLET, FILM COATED ORAL at 10:22

## 2017-12-01 RX ADMIN — DILTIAZEM HYDROCHLORIDE SCH MG: 60 TABLET, FILM COATED ORAL at 21:46

## 2017-12-01 RX ADMIN — HUMAN INSULIN SCH: 100 INJECTION, SOLUTION SUBCUTANEOUS at 10:18

## 2017-12-01 RX ADMIN — BUDESONIDE AND FORMOTEROL FUMARATE DIHYDRATE SCH PUFF: 160; 4.5 AEROSOL RESPIRATORY (INHALATION) at 21:39

## 2017-12-01 RX ADMIN — TIOTROPIUM BROMIDE SCH MCG: 18 CAPSULE ORAL; RESPIRATORY (INHALATION) at 09:00

## 2017-12-01 RX ADMIN — Medication SCH ML: at 21:00

## 2017-12-01 RX ADMIN — ENOXAPARIN SODIUM SCH MG: 40 INJECTION SUBCUTANEOUS at 04:30

## 2017-12-01 RX ADMIN — SODIUM CHLORIDE SCH MLS/HR: 900 INJECTION INTRAVENOUS at 04:25

## 2017-12-01 RX ADMIN — FAMOTIDINE SCH MG: 20 TABLET, FILM COATED ORAL at 10:18

## 2017-12-01 RX ADMIN — HUMAN INSULIN SCH: 100 INJECTION, SOLUTION SUBCUTANEOUS at 13:19

## 2017-12-01 RX ADMIN — DILTIAZEM HYDROCHLORIDE SCH MG: 60 TABLET, FILM COATED ORAL at 04:31

## 2017-12-01 RX ADMIN — HUMAN INSULIN SCH: 100 INJECTION, SOLUTION SUBCUTANEOUS at 19:30

## 2017-12-01 RX ADMIN — IPRATROPIUM BROMIDE AND ALBUTEROL SULFATE SCH AMPULE: .5; 3 SOLUTION RESPIRATORY (INHALATION) at 03:57

## 2017-12-01 RX ADMIN — AZITHROMYCIN SCH MLS/HR: 500 INJECTION, POWDER, LYOPHILIZED, FOR SOLUTION INTRAVENOUS at 04:27

## 2017-12-01 RX ADMIN — DILTIAZEM HYDROCHLORIDE SCH MG: 60 TABLET, FILM COATED ORAL at 00:43

## 2017-12-01 RX ADMIN — FAMOTIDINE SCH MG: 20 TABLET, FILM COATED ORAL at 21:37

## 2017-12-01 RX ADMIN — HUMAN INSULIN SCH: 100 INJECTION, SOLUTION SUBCUTANEOUS at 00:43

## 2017-12-01 RX ADMIN — ENALAPRILAT PRN MG: 1.25 INJECTION INTRAVENOUS at 04:31

## 2017-12-01 RX ADMIN — IPRATROPIUM BROMIDE AND ALBUTEROL SULFATE SCH AMPULE: .5; 3 SOLUTION RESPIRATORY (INHALATION) at 07:52

## 2017-12-01 RX ADMIN — IPRATROPIUM BROMIDE AND ALBUTEROL SULFATE SCH AMPULE: .5; 3 SOLUTION RESPIRATORY (INHALATION) at 11:16

## 2017-12-01 RX ADMIN — DILTIAZEM HYDROCHLORIDE SCH MG: 60 TABLET, FILM COATED ORAL at 17:10

## 2017-12-01 RX ADMIN — Medication SCH ML: at 10:18

## 2017-12-01 RX ADMIN — FUROSEMIDE SCH MG: 10 INJECTION, SOLUTION INTRAMUSCULAR; INTRAVENOUS at 18:55

## 2017-12-02 VITALS — HEART RATE: 97 BPM

## 2017-12-02 VITALS
DIASTOLIC BLOOD PRESSURE: 73 MMHG | OXYGEN SATURATION: 93 % | HEART RATE: 79 BPM | TEMPERATURE: 98.1 F | RESPIRATION RATE: 20 BRPM | SYSTOLIC BLOOD PRESSURE: 126 MMHG

## 2017-12-02 VITALS
SYSTOLIC BLOOD PRESSURE: 164 MMHG | TEMPERATURE: 98.7 F | OXYGEN SATURATION: 96 % | RESPIRATION RATE: 20 BRPM | DIASTOLIC BLOOD PRESSURE: 70 MMHG | HEART RATE: 85 BPM

## 2017-12-02 VITALS
RESPIRATION RATE: 20 BRPM | TEMPERATURE: 97.9 F | OXYGEN SATURATION: 94 % | SYSTOLIC BLOOD PRESSURE: 136 MMHG | HEART RATE: 83 BPM | DIASTOLIC BLOOD PRESSURE: 65 MMHG

## 2017-12-02 VITALS
OXYGEN SATURATION: 98 % | TEMPERATURE: 97.9 F | DIASTOLIC BLOOD PRESSURE: 72 MMHG | HEART RATE: 78 BPM | SYSTOLIC BLOOD PRESSURE: 127 MMHG | RESPIRATION RATE: 20 BRPM

## 2017-12-02 VITALS — HEART RATE: 84 BPM

## 2017-12-02 VITALS
TEMPERATURE: 98.2 F | OXYGEN SATURATION: 92 % | RESPIRATION RATE: 19 BRPM | SYSTOLIC BLOOD PRESSURE: 168 MMHG | DIASTOLIC BLOOD PRESSURE: 74 MMHG | HEART RATE: 91 BPM

## 2017-12-02 VITALS
DIASTOLIC BLOOD PRESSURE: 74 MMHG | HEART RATE: 85 BPM | TEMPERATURE: 97.3 F | OXYGEN SATURATION: 94 % | RESPIRATION RATE: 18 BRPM | SYSTOLIC BLOOD PRESSURE: 146 MMHG

## 2017-12-02 VITALS — OXYGEN SATURATION: 98 %

## 2017-12-02 VITALS — OXYGEN SATURATION: 93 %

## 2017-12-02 VITALS — HEART RATE: 76 BPM

## 2017-12-02 VITALS — HEART RATE: 87 BPM

## 2017-12-02 VITALS — HEART RATE: 94 BPM

## 2017-12-02 LAB
ALP SERPL-CCNC: 88 U/L (ref 45–117)
ALT SERPL-CCNC: 34 U/L (ref 10–53)
ANION GAP SERPL CALC-SCNC: 4 MEQ/L (ref 5–15)
AST SERPL-CCNC: 28 U/L (ref 15–37)
BASE EXCESS BLD CALC-SCNC: 14.6 MMOL/L (ref -2–2)
BENZODIAZEPINES PNL UR: 94 % (ref 90–100)
BILIRUB SERPL-MCNC: 0.6 MG/DL (ref 0.2–1)
BLOOD GAS CARBOXYHEMOGLOBIN: 1.2 % (ref 0–4)
BLOOD GAS HCO3: 39 MMOL/L (ref 22–26)
BLOOD GAS OXYGEN CONTENT: 19.1 VOL % (ref 12–20)
BLOOD GAS PCO2: 54 MMHG (ref 38–42)
BUN SERPL-MCNC: 21 MG/DL (ref 7–18)
CHLORIDE SERPL-SCNC: 95 MEQ/L (ref 98–107)
CRITICAL VALUE: YES
DRAW SITE: (no result)
ERYTHROCYTE [DISTWIDTH] IN BLOOD BY AUTOMATED COUNT: 12.9 % (ref 11.6–17.2)
GFR SERPLBLD BASED ON 1.73 SQ M-ARVRAT: 105 ML/MIN (ref 89–?)
HCO3 BLD-SCNC: 41.9 MEQ/L (ref 21–32)
HCT VFR BLD CALC: 42.1 % (ref 35–46)
HEMO FLAGS: (no result)
LITER FLOW: 2 L/M
MAGNESIUM SERPL-MCNC: 2 MG/DL (ref 1.5–2.5)
MCH RBC QN AUTO: 32.4 PG (ref 27–34)
MCHC RBC AUTO-ENTMCNC: 33.8 % (ref 32–36)
MCV RBC AUTO: 96.1 FL (ref 80–100)
METHGB MFR BLDA: 0.8 % (ref 0–2)
NEUTS BAND # BLD MANUAL: 14.6 TH/MM3 (ref 1.8–7.7)
NEUTS BAND NFR BLD: 7 % (ref 0–6)
NEUTS SEG NFR BLD MANUAL: 88 % (ref 16–70)
NUMBER OF ARTERIAL PUNCTURES: 1
OXYGEN DEVICE: (no result)
PLAT MORPH BLD: NORMAL
PLATELET # BLD: 175 TH/MM3 (ref 150–450)
PLATELET BLD QL SMEAR: NORMAL
PO2 BLD: 79 MMHG (ref 61–120)
POTASSIUM SERPL-SCNC: 2.8 MEQ/L (ref 3.5–5.1)
RBC # BLD AUTO: 4.38 MIL/MM3 (ref 4–5.3)
RBC MORPH BLD: NORMAL
SALICYLATES SERPL-MCNC: 14.4 G/DL (ref 12–16)
SCAN/DIFF: (no result)
SODIUM SERPL-SCNC: 141 MEQ/L (ref 136–145)
STAT: NO
TEMP CORR TO: 98.6
TOXIC GRANULES BLD QL SMEAR: (no result)
ULNAR PULSE: PRESENT
WBC # BLD AUTO: 15.4 TH/MM3 (ref 4–11)
WBC DIFF SAMPLE: 100

## 2017-12-02 RX ADMIN — HUMAN INSULIN SCH: 100 INJECTION, SOLUTION SUBCUTANEOUS at 07:30

## 2017-12-02 RX ADMIN — AZITHROMYCIN SCH MLS/HR: 500 INJECTION, POWDER, LYOPHILIZED, FOR SOLUTION INTRAVENOUS at 04:48

## 2017-12-02 RX ADMIN — IPRATROPIUM BROMIDE AND ALBUTEROL SULFATE SCH AMPULE: .5; 3 SOLUTION RESPIRATORY (INHALATION) at 00:34

## 2017-12-02 RX ADMIN — FAMOTIDINE SCH MG: 20 TABLET, FILM COATED ORAL at 20:42

## 2017-12-02 RX ADMIN — IPRATROPIUM BROMIDE AND ALBUTEROL SULFATE SCH AMPULE: .5; 3 SOLUTION RESPIRATORY (INHALATION) at 11:02

## 2017-12-02 RX ADMIN — Medication SCH ML: at 09:49

## 2017-12-02 RX ADMIN — DILTIAZEM HYDROCHLORIDE SCH MG: 60 TABLET, FILM COATED ORAL at 22:31

## 2017-12-02 RX ADMIN — DILTIAZEM HYDROCHLORIDE SCH MG: 60 TABLET, FILM COATED ORAL at 11:00

## 2017-12-02 RX ADMIN — TIOTROPIUM BROMIDE SCH MCG: 18 CAPSULE ORAL; RESPIRATORY (INHALATION) at 09:00

## 2017-12-02 RX ADMIN — POTASSIUM CHLORIDE SCH MLS/HR: 200 INJECTION, SOLUTION INTRAVENOUS at 20:42

## 2017-12-02 RX ADMIN — HUMAN INSULIN SCH: 100 INJECTION, SOLUTION SUBCUTANEOUS at 13:15

## 2017-12-02 RX ADMIN — HUMAN INSULIN SCH: 100 INJECTION, SOLUTION SUBCUTANEOUS at 00:19

## 2017-12-02 RX ADMIN — DILTIAZEM HYDROCHLORIDE SCH MG: 60 TABLET, FILM COATED ORAL at 18:30

## 2017-12-02 RX ADMIN — BUDESONIDE AND FORMOTEROL FUMARATE DIHYDRATE SCH PUFF: 160; 4.5 AEROSOL RESPIRATORY (INHALATION) at 20:52

## 2017-12-02 RX ADMIN — IPRATROPIUM BROMIDE AND ALBUTEROL SULFATE SCH AMPULE: .5; 3 SOLUTION RESPIRATORY (INHALATION) at 15:11

## 2017-12-02 RX ADMIN — Medication SCH ML: at 20:42

## 2017-12-02 RX ADMIN — FAMOTIDINE SCH MG: 20 TABLET, FILM COATED ORAL at 09:00

## 2017-12-02 RX ADMIN — IPRATROPIUM BROMIDE AND ALBUTEROL SULFATE SCH AMPULE: .5; 3 SOLUTION RESPIRATORY (INHALATION) at 04:02

## 2017-12-02 RX ADMIN — FUROSEMIDE SCH MG: 10 INJECTION, SOLUTION INTRAMUSCULAR; INTRAVENOUS at 09:49

## 2017-12-02 RX ADMIN — IPRATROPIUM BROMIDE AND ALBUTEROL SULFATE SCH AMPULE: .5; 3 SOLUTION RESPIRATORY (INHALATION) at 07:31

## 2017-12-02 RX ADMIN — ENOXAPARIN SODIUM SCH MG: 40 INJECTION SUBCUTANEOUS at 04:40

## 2017-12-02 RX ADMIN — HUMAN INSULIN SCH: 100 INJECTION, SOLUTION SUBCUTANEOUS at 21:31

## 2017-12-02 RX ADMIN — DILTIAZEM HYDROCHLORIDE SCH MG: 60 TABLET, FILM COATED ORAL at 04:40

## 2017-12-02 RX ADMIN — POTASSIUM CHLORIDE SCH MLS/HR: 200 INJECTION, SOLUTION INTRAVENOUS at 18:21

## 2017-12-02 RX ADMIN — BUDESONIDE AND FORMOTEROL FUMARATE DIHYDRATE SCH PUFF: 160; 4.5 AEROSOL RESPIRATORY (INHALATION) at 09:00

## 2017-12-02 RX ADMIN — SODIUM CHLORIDE SCH MLS/HR: 900 INJECTION INTRAVENOUS at 04:44

## 2017-12-02 NOTE — HHI.PR
Subjective


Remarks


as per RN very agitated overnight - received total of 1 mg of ativan, ambien


Patient lethargic





Objective


Vitals





Vital Signs








  Date Time  Temp Pulse Resp B/P (MAP) Pulse Ox O2 Delivery O2 Flow Rate FiO2


 


12/2/17 12:00  85      


 


12/2/17 12:00 98.7 81 20 164/70 (101) 96   


 


12/2/17 08:08 97.9 78 20 127/72 (90) 98   


 


12/2/17 08:00  76      


 


12/2/17 07:31     98 Nasal Cannula 2.00 


 


12/2/17 06:00  97      


 


12/2/17 04:00 98.1 79 20 126/73 (90) 93   


 


12/2/17 00:00 97.3 85 18 146/74 (98) 94   


 


12/1/17 20:15     96 Nasal Cannula 2.00 


 


12/1/17 20:00 96.4 79 18 160/84 (109) 96   


 


12/1/17 16:08 98.3 82 19 176/85 (115) 96   


 


12/1/17 15:57  91      














I/O      


 


 12/1/17 12/1/17 12/1/17 12/2/17 12/2/17 12/2/17





 07:00 15:00 23:00 07:00 15:00 23:00


 


Intake Total 350 ml 250 ml 380 ml 100 ml  


 


Output Total    1200 ml  


 


Balance 350 ml 250 ml 380 ml -1100 ml  


 


      


 


Intake Oral   380 ml   


 


IV Total 350 ml 250 ml  100 ml  


 


Output Urine Total    1200 ml  


 


# Voids   4  2 


 


# Bowel Movements   1   








Result Diagram:  


11/30/17 0605                                                                  

              11/30/17 0605





Imaging





Last Impressions








Chest X-Ray 11/30/17 0600 Signed





Impressions: 





 Service Date/Time:  Thursday, November 30, 2017 04:35 - CONCLUSION: No acute 





 disease.       Sergey Harley MD 


 


CT Angiography 11/28/17 0000 Signed





Impressions: 





 Service Date/Time:  Tuesday, November 28, 2017 17:20 - CONCLUSION:  Diffuse 





 miliary parenchymal lung disease. Potentially reactive central mediastinal and 





 bilateral rosalio are ilene prominence. Differential considerations would include 





 atypical pneumonias, pneumoconiosis, sarcoidosis, metastatic disease. No 





 evidence of pulmonary embolism.      Sergey Johnson MD 








Objective Remarks


GEN:  Lying in bed, lethargic.


HEENT: Oral mucosa is dry. Neck veins are not grossly distended.


LUNGS: Mild expiratory wheezing and few coarse crackles. 


HEART: S1-S2 normal no murmurs


ABDOMEN: Abdomen is obese but soft.


EXTREMITIES:  +2 edema in lower extremities.


NEURO: ALethargic but awakens upon stimulation. moves all extremities.


Medications and IVs





Current Medications








 Medications


  (Trade)  Dose


 Ordered  Sig/Martha


 Route  Start Time


 Stop Time Status Last Admin


 


 Ceftriaxone


 Sodium 1000 mg/


 Sodium Chloride  100 ml @ 


 200 mls/hr  Q24H


 IV  11/29/17 04:30


    12/2/17 04:44


 


 


 Azithromycin 500


 mg/Sodium Chloride  250 ml @ 


 250 mls/hr  Q24H


 IV  11/29/17 05:30


    12/2/17 04:48


 


 


  (NS Flush)  2 ml  BID


 IV FLUSH  11/28/17 09:00


    12/2/17 09:49


 


 


  (NS Flush)  2 ml  UNSCH  PRN


 IV FLUSH  11/28/17 05:30


     


 


 


  (Albuterol Neb)  2.5 mg  Q2HR NEB  PRN


 INH  11/28/17 05:30


     


 


 


  (Lovenox Inj)  40 mg  Q24H


 SQ  11/28/17 05:30


    12/2/17 04:40


 


 


  (Duoneb Neb)  1 ampule  Q4HR  NEB


 INH  11/28/17 16:00


    12/2/17 15:11


 


 


  (SoluMEDROL INJ)  60 mg  Q6HR


 IV PUSH  11/28/17 18:00


    12/2/17 12:53


 


 


  (D50w (Vial) Inj)  50 ml  UNSCH  PRN


 IV PUSH  11/28/17 13:30


     


 


 


  (Glucagon Inj)  1 mg  UNSCH  PRN


 OTHER  11/28/17 13:30


     


 


 


  (NovoLIN R


 SUPPLEMENTAL


 SCALE)  1  Q6H


 SQ  11/28/17 13:30


    12/2/17 13:15


 


 


  (Apresoline Inj)  10 mg  Q6H  PRN


 IV PUSH  11/28/17 13:30


    12/1/17 06:55


 


 


  (Cardizem)  60 mg  Q6H


 PO  11/28/17 17:00


    12/2/17 04:40


 


 


  (Symbicort


 160-4.5 Inh)  2 puff  Q12HR


 INH  11/29/17 09:15


    12/1/17 21:39


 


 


  (Spiriva Inh)  18 mcg  DAILY


 INH  11/29/17 09:15


    11/30/17 09:11


 


 


  (Pepcid)  20 mg  BID


 PO  11/30/17 21:00


    12/1/17 21:37


 


 


  (Lasix Inj)  40 mg  DAILY


 IV PUSH  12/1/17 18:30


    12/2/17 09:49


 


 


  (Vasotec Inj)  1.25 mg  Q6H  PRN


 IV PUSH  12/2/17 12:30


    12/2/17 13:15


 


 


  (SEROquel)  50 mg  HS


 PO  12/2/17 21:00


     


 


 


  (Haldol Inj)  2 mg  Q6HR  PRN


 IV PUSH  12/2/17 13:15


     


 











A/P


Problem List:  


(1) COPD exacerbation


ICD Code:  J44.1 - Chronic obstructive pulmonary disease with (acute) 

exacerbation


Status:  Acute


(2) Hypercapnic respiratory failure


ICD Code:  J96.92 - Respiratory failure, unspecified with hypercapnia


(3) Toxic encephalopathy


ICD Code:  G92 - Toxic encephalopathy


(4) Bilateral lower extremity edema


ICD Code:  R60.0 - Localized edema


Assessment and Plan


IMPRESSION


Acute hypercapnic respiratory failure.


Chronic obstructive pulmonary disease exacerbation.


Leukocytosis with bandemia.


History of rheumatoid arthritis on Plaquenil.


Breast CA status post radiation treatment and lumpectomy.


Hypertension.


History of recent retinal detachment surgery


Encephalopathy


 


RECOMMENDATIONS


1.  Monitor neuro status closely and avoid any sedatives.


2.  Continue with oxygen maintain sat above 92%.


3.  DuoNeb q. 4+ q. 2 p.r.n. for shortness of breath.


4.  Continue with IV steroids. Pulm Dr. Oglesby


5.  Noninvasive positive pressure ventilation as needed.  


6.  CT pulmonary angiogram of the chest -diffuse miliary disease ? atypical 

pneumonia vs ILD RA associated. Defer to pulmonology


7.  Chronic lung disease in the seen in the setting of Plaquenil use.


8.  Monitor heart rate and blood pressure closely and maintain MAP greater 65 

mmHg.


9.  NS at 50 an hour- DC


10. Monitor renal function Is and Os and electrolyte replacement per protocol.


11. Regular diet, Pepcid 10 mg IV q. 12- change to PO


12. Continue with Rocephin and Zithromax 


13.  DVT prophylaxis with SCDs Lovenox 40 mg Subcu daily.


15. 12/2 Patient with BL lower extremity edema. Likely due to fluid overload. 

CXR no acute disease. Continue IV lasix, monitor I's and O's, daily weight. 

Check 2 D echo


16. 12/2 Likely lethargy and with toxic encephalopathy due to benzodiazepine 

use. DC Benzodiazepines. Will Rx Seroquel at bedtime and Haldol as needed for 

agitation. Avoid  Benzodiazepine use.


Discharge Planning


Pending resolution of encephalopathy and clinical improvement. Pending 2 D echo.





Problem Qualifiers





(1) Hypercapnic respiratory failure:  


Qualified Codes:  J96.22 - Acute and chronic respiratory failure with 

hypercapnia








Nick Walsh MD Dec 2, 2017 15:42

## 2017-12-03 VITALS
RESPIRATION RATE: 18 BRPM | HEART RATE: 78 BPM | TEMPERATURE: 97.8 F | SYSTOLIC BLOOD PRESSURE: 168 MMHG | DIASTOLIC BLOOD PRESSURE: 86 MMHG | OXYGEN SATURATION: 94 %

## 2017-12-03 VITALS
DIASTOLIC BLOOD PRESSURE: 75 MMHG | SYSTOLIC BLOOD PRESSURE: 164 MMHG | TEMPERATURE: 98 F | OXYGEN SATURATION: 97 % | RESPIRATION RATE: 18 BRPM | HEART RATE: 72 BPM

## 2017-12-03 VITALS
RESPIRATION RATE: 18 BRPM | DIASTOLIC BLOOD PRESSURE: 77 MMHG | SYSTOLIC BLOOD PRESSURE: 164 MMHG | HEART RATE: 75 BPM | TEMPERATURE: 97.4 F | OXYGEN SATURATION: 95 %

## 2017-12-03 VITALS
SYSTOLIC BLOOD PRESSURE: 142 MMHG | OXYGEN SATURATION: 97 % | RESPIRATION RATE: 18 BRPM | TEMPERATURE: 98.7 F | HEART RATE: 76 BPM | DIASTOLIC BLOOD PRESSURE: 66 MMHG

## 2017-12-03 VITALS
TEMPERATURE: 97.3 F | SYSTOLIC BLOOD PRESSURE: 163 MMHG | OXYGEN SATURATION: 93 % | DIASTOLIC BLOOD PRESSURE: 68 MMHG | HEART RATE: 78 BPM | RESPIRATION RATE: 20 BRPM

## 2017-12-03 VITALS — HEART RATE: 74 BPM

## 2017-12-03 VITALS — OXYGEN SATURATION: 96 %

## 2017-12-03 VITALS
SYSTOLIC BLOOD PRESSURE: 160 MMHG | HEART RATE: 83 BPM | OXYGEN SATURATION: 94 % | TEMPERATURE: 97.9 F | RESPIRATION RATE: 20 BRPM

## 2017-12-03 VITALS — OXYGEN SATURATION: 95 %

## 2017-12-03 VITALS — OXYGEN SATURATION: 94 %

## 2017-12-03 LAB
ANION GAP SERPL CALC-SCNC: 5 MEQ/L (ref 5–15)
BUN SERPL-MCNC: 31 MG/DL (ref 7–18)
CHLORIDE SERPL-SCNC: 95 MEQ/L (ref 98–107)
ERYTHROCYTE [DISTWIDTH] IN BLOOD BY AUTOMATED COUNT: 12.7 % (ref 11.6–17.2)
GFR SERPLBLD BASED ON 1.73 SQ M-ARVRAT: 75 ML/MIN (ref 89–?)
HCO3 BLD-SCNC: 39.8 MEQ/L (ref 21–32)
HCT VFR BLD CALC: 41.3 % (ref 35–46)
MCH RBC QN AUTO: 32 PG (ref 27–34)
MCHC RBC AUTO-ENTMCNC: 33.3 % (ref 32–36)
MCV RBC AUTO: 95.9 FL (ref 80–100)
PLATELET # BLD: 142 TH/MM3 (ref 150–450)
POTASSIUM SERPL-SCNC: 3.6 MEQ/L (ref 3.5–5.1)
RBC # BLD AUTO: 4.31 MIL/MM3 (ref 4–5.3)
REVIEW FLAG: (no result)
SODIUM SERPL-SCNC: 140 MEQ/L (ref 136–145)
WBC # BLD AUTO: 17 TH/MM3 (ref 4–11)

## 2017-12-03 RX ADMIN — DILTIAZEM HYDROCHLORIDE SCH MG: 60 TABLET, FILM COATED ORAL at 12:30

## 2017-12-03 RX ADMIN — DILTIAZEM HYDROCHLORIDE SCH MG: 60 TABLET, FILM COATED ORAL at 05:25

## 2017-12-03 RX ADMIN — HUMAN INSULIN SCH: 100 INJECTION, SOLUTION SUBCUTANEOUS at 20:47

## 2017-12-03 RX ADMIN — DILTIAZEM HYDROCHLORIDE SCH MG: 60 TABLET, FILM COATED ORAL at 17:42

## 2017-12-03 RX ADMIN — BUDESONIDE AND FORMOTEROL FUMARATE DIHYDRATE SCH PUFF: 160; 4.5 AEROSOL RESPIRATORY (INHALATION) at 09:32

## 2017-12-03 RX ADMIN — DILTIAZEM HYDROCHLORIDE SCH MG: 60 TABLET, FILM COATED ORAL at 22:30

## 2017-12-03 RX ADMIN — HUMAN INSULIN SCH: 100 INJECTION, SOLUTION SUBCUTANEOUS at 01:30

## 2017-12-03 RX ADMIN — FAMOTIDINE SCH MG: 20 TABLET, FILM COATED ORAL at 09:31

## 2017-12-03 RX ADMIN — HUMAN INSULIN SCH: 100 INJECTION, SOLUTION SUBCUTANEOUS at 09:30

## 2017-12-03 RX ADMIN — SODIUM CHLORIDE SCH MLS/HR: 900 INJECTION INTRAVENOUS at 05:25

## 2017-12-03 RX ADMIN — AZITHROMYCIN SCH MLS/HR: 500 INJECTION, POWDER, LYOPHILIZED, FOR SOLUTION INTRAVENOUS at 06:10

## 2017-12-03 RX ADMIN — HUMAN INSULIN SCH: 100 INJECTION, SOLUTION SUBCUTANEOUS at 14:37

## 2017-12-03 RX ADMIN — FAMOTIDINE SCH MG: 20 TABLET, FILM COATED ORAL at 20:47

## 2017-12-03 RX ADMIN — TIOTROPIUM BROMIDE SCH MCG: 18 CAPSULE ORAL; RESPIRATORY (INHALATION) at 09:32

## 2017-12-03 RX ADMIN — FUROSEMIDE SCH MG: 10 INJECTION, SOLUTION INTRAMUSCULAR; INTRAVENOUS at 09:31

## 2017-12-03 RX ADMIN — BUDESONIDE AND FORMOTEROL FUMARATE DIHYDRATE SCH PUFF: 160; 4.5 AEROSOL RESPIRATORY (INHALATION) at 20:50

## 2017-12-03 RX ADMIN — Medication SCH ML: at 20:50

## 2017-12-03 RX ADMIN — Medication SCH ML: at 09:31

## 2017-12-03 RX ADMIN — ENOXAPARIN SODIUM SCH MG: 40 INJECTION SUBCUTANEOUS at 05:25

## 2017-12-03 RX ADMIN — METHYLPREDNISOLONE SODIUM SUCCINATE SCH MG: 40 INJECTION, POWDER, FOR SOLUTION INTRAMUSCULAR; INTRAVENOUS at 20:48

## 2017-12-03 NOTE — HHI.PR
Subjective


Remarks


Patient feels much better.  Denies chest pain or shortness of breath.


As per family member who is at bedside, the patient is cognitively better today

, eating better.


Afebrile.  Noted to have elevated blood pressure.





Objective


Vitals





Vital Signs








  Date Time  Temp Pulse Resp B/P (MAP) Pulse Ox O2 Delivery O2 Flow Rate FiO2


 


12/3/17 12:00  78      


 


12/3/17 12:00 97.8 82 18 168/86 (113) 94   


 


12/3/17 08:00  78      


 


12/3/17 08:00      Nasal Cannula 2.00 


 


12/3/17 08:00 97.3 84 20 163/68 (99) 93   


 


12/3/17 04:01  74      


 


12/3/17 04:00      Nasal Cannula 2.00 


 


12/3/17 04:00 97.4 75 18 164/77 (106) 95   


 


12/3/17 03:00     95 Nasal Cannula 2.00 


 


12/3/17 01:30     96   30


 


12/3/17 00:00      Bi-Pap  30


 


12/3/17 00:00 97.9 83 20 160/ 94   


 


12/2/17 23:58  87      


 


12/2/17 21:03     93 Nasal Cannula 3.00 


 


12/2/17 20:00 97.9 83 20 136/65 (88) 94   


 


12/2/17 20:00      Nasal Cannula 2.00 


 


12/2/17 19:48  84      














I/O      


 


 12/2/17 12/2/17 12/2/17 12/3/17 12/3/17 12/3/17





 07:00 15:00 23:00 07:00 15:00 23:00


 


Intake Total 100 ml  320 ml 374 ml 250 ml 


 


Output Total 1200 ml     


 


Balance -1100 ml  320 ml 374 ml 250 ml 


 


      


 


Intake Oral   120 ml 24 ml  


 


IV Total 100 ml  200 ml 350 ml 250 ml 


 


Output Urine Total 1200 ml     


 


# Voids  2 3 2  


 


# Bowel Movements   0 0  








Result Diagram:  


12/3/17 1422                                                                   

             12/3/17 1422





Imaging





Last Impressions








Chest X-Ray 11/30/17 0600 Signed





Impressions: 





 Service Date/Time:  Thursday, November 30, 2017 04:35 - CONCLUSION: No acute 





 disease.       Sergey Harley MD 


 


CT Angiography 11/28/17 0000 Signed





Impressions: 





 Service Date/Time:  Tuesday, November 28, 2017 17:20 - CONCLUSION:  Diffuse 





 miliary parenchymal lung disease. Potentially reactive central mediastinal and 





 bilateral rosalio are ilene prominence. Differential considerations would include 





 atypical pneumonias, pneumoconiosis, sarcoidosis, metastatic disease. No 





 evidence of pulmonary embolism.      Sergey Johnson MD 








Objective Remarks


GEN:  Lying in bed, lethargic.


HEENT: Oral mucosa is dry. Neck veins are not grossly distended.


LUNGS: Mild expiratory wheezing and few coarse crackles. 


HEART: S1-S2 normal no murmurs


ABDOMEN: Abdomen is obese but soft.


EXTREMITIES:  +2 edema in lower extremities.


NEURO: ALethargic but awakens upon stimulation. moves all extremities.


Medications and IVs





Current Medications








 Medications


  (Trade)  Dose


 Ordered  Sig/Martha


 Route  Start Time


 Stop Time Status Last Admin


 


 Ceftriaxone


 Sodium 1000 mg/


 Sodium Chloride  100 ml @ 


 200 mls/hr  Q24H


 IV  11/29/17 04:30


    12/3/17 05:25


 


 


 Azithromycin 500


 mg/Sodium Chloride  250 ml @ 


 250 mls/hr  Q24H


 IV  11/29/17 05:30


    12/3/17 06:10


 


 


  (NS Flush)  2 ml  BID


 IV FLUSH  11/28/17 09:00


    12/3/17 09:31


 


 


  (NS Flush)  2 ml  UNSCH  PRN


 IV FLUSH  11/28/17 05:30


    12/3/17 05:26


 


 


  (Albuterol Neb)  2.5 mg  Q2HR NEB  PRN


 INH  11/28/17 05:30


    12/2/17 21:01


 


 


  (Lovenox Inj)  40 mg  Q24H


 SQ  11/28/17 05:30


    12/3/17 05:25


 


 


  (SoluMEDROL INJ)  60 mg  Q6HR


 IV PUSH  11/28/17 18:00


    12/3/17 12:31


 


 


  (D50w (Vial) Inj)  50 ml  UNSCH  PRN


 IV PUSH  11/28/17 13:30


     


 


 


  (Glucagon Inj)  1 mg  UNSCH  PRN


 OTHER  11/28/17 13:30


     


 


 


  (NovoLIN R


 SUPPLEMENTAL


 SCALE)  1  Q6H


 SQ  11/28/17 13:30


    12/3/17 14:37


 


 


  (Apresoline Inj)  10 mg  Q6H  PRN


 IV PUSH  11/28/17 13:30


    12/1/17 06:55


 


 


  (Cardizem)  60 mg  Q6H


 PO  11/28/17 17:00


    12/3/17 12:30


 


 


  (Symbicort


 160-4.5 Inh)  2 puff  Q12HR


 INH  11/29/17 09:15


    12/3/17 09:32


 


 


  (Spiriva Inh)  18 mcg  DAILY


 INH  11/29/17 09:15


    12/3/17 09:32


 


 


  (Pepcid)  20 mg  BID


 PO  11/30/17 21:00


    12/3/17 09:31


 


 


  (Lasix Inj)  40 mg  DAILY


 IV PUSH  12/1/17 18:30


    12/3/17 09:31


 


 


  (Vasotec Inj)  1.25 mg  Q6H  PRN


 IV PUSH  12/2/17 12:30


    12/2/17 13:15


 


 


  (SEROquel)  50 mg  HS


 PO  12/2/17 21:00


    12/2/17 20:42


 


 


  (Haldol Inj)  2 mg  Q6HR  PRN


 IV PUSH  12/2/17 13:15


     


 











A/P


Problem List:  


(1) COPD exacerbation


ICD Code:  J44.1 - Chronic obstructive pulmonary disease with (acute) 

exacerbation


Status:  Resolved


(2) Hypercapnic respiratory failure


ICD Code:  J96.92 - Respiratory failure, unspecified with hypercapnia


(3) Toxic encephalopathy


ICD Code:  G92 - Toxic encephalopathy


(4) Bilateral lower extremity edema


ICD Code:  R60.0 - Localized edema


Assessment and Plan


IMPRESSION


Acute hypercapnic respiratory failure.


Chronic obstructive pulmonary disease exacerbation.


Leukocytosis with bandemia.


History of rheumatoid arthritis on Plaquenil.


Breast CA status post radiation treatment and lumpectomy.


Hypertension.


History of recent retinal detachment surgery


Encephalopathy


 


RECOMMENDATIONS


1.  Monitor neuro status closely and avoid any sedatives.


2.  Continue with oxygen maintain sat above 92%.


3.  DuoNeb q. 4+ q. 2 p.r.n. for shortness of breath.


4.  Continue with IV steroids. Will taper dose to 40 mg IV q 12 hrs. Pulm Dr. Oglesby


5.  Noninvasive positive pressure ventilation as needed.  


6.  CT pulmonary angiogram of the chest -diffuse miliary disease ? atypical 

pneumonia vs ILD RA associated. Defer to pulmonology


7.  Chronic lung disease in the seen in the setting of Plaquenil use.


8.  Monitor heart rate and blood pressure closely and maintain MAP greater 65 

mmHg.


9.  NS at 50 an hour- DC


10. Monitor renal function Is and Os and electrolyte replacement per protocol.


11. Regular diet, Pepcid 10 mg IV q. 12- change to PO


12. Continue with Rocephin and Zithromax 


13.  DVT prophylaxis with SCDs Lovenox 40 mg Subcu daily.


15. 12/2 Patient with BL lower extremity edema. Likely due to fluid overload. 

CXR no acute disease. Continue IV lasix, monitor I's and O's, daily weight. 

Check 2 D echo


   -12/3 discontinue IV Lasix and start oral Lasix.


16. 12/2 Likely lethargy and with toxic encephalopathy due to benzodiazepine 

use. DC Benzodiazepines. Will Rx Seroquel at bedtime and Haldol as needed for 

agitation. Avoid  Benzodiazepine use.


   -12/3 patient started on 50 mg by mouth at bedtime of Seroquel.  Will 

decrease dose to 25 mg due to patient's somnolence.  Encephalopathy resolving.


Discharge Planning


Pending resolution of encephalopathy and clinical improvement. Pending 2 D echo.





Problem Qualifiers





(1) Hypercapnic respiratory failure:  


Qualified Codes:  J96.22 - Acute and chronic respiratory failure with 

hypercapnia








Nick Walsh MD Dec 3, 2017 16:28

## 2017-12-04 VITALS
DIASTOLIC BLOOD PRESSURE: 92 MMHG | HEART RATE: 84 BPM | RESPIRATION RATE: 18 BRPM | TEMPERATURE: 97.3 F | OXYGEN SATURATION: 96 % | SYSTOLIC BLOOD PRESSURE: 167 MMHG

## 2017-12-04 VITALS — OXYGEN SATURATION: 96 %

## 2017-12-04 VITALS
HEART RATE: 18 BPM | DIASTOLIC BLOOD PRESSURE: 72 MMHG | RESPIRATION RATE: 18 BRPM | OXYGEN SATURATION: 98 % | TEMPERATURE: 97.5 F | SYSTOLIC BLOOD PRESSURE: 151 MMHG

## 2017-12-04 VITALS
HEART RATE: 85 BPM | TEMPERATURE: 97.1 F | OXYGEN SATURATION: 97 % | DIASTOLIC BLOOD PRESSURE: 82 MMHG | SYSTOLIC BLOOD PRESSURE: 114 MMHG | RESPIRATION RATE: 18 BRPM

## 2017-12-04 VITALS
SYSTOLIC BLOOD PRESSURE: 124 MMHG | DIASTOLIC BLOOD PRESSURE: 63 MMHG | TEMPERATURE: 98.1 F | HEART RATE: 78 BPM | OXYGEN SATURATION: 95 % | RESPIRATION RATE: 18 BRPM

## 2017-12-04 VITALS
OXYGEN SATURATION: 96 % | TEMPERATURE: 98.2 F | RESPIRATION RATE: 18 BRPM | HEART RATE: 75 BPM | SYSTOLIC BLOOD PRESSURE: 148 MMHG | DIASTOLIC BLOOD PRESSURE: 67 MMHG

## 2017-12-04 LAB
ANION GAP SERPL CALC-SCNC: 7 MEQ/L (ref 5–15)
BUN SERPL-MCNC: 31 MG/DL (ref 7–18)
CHLORIDE SERPL-SCNC: 97 MEQ/L (ref 98–107)
ERYTHROCYTE [DISTWIDTH] IN BLOOD BY AUTOMATED COUNT: 12.5 % (ref 11.6–17.2)
GFR SERPLBLD BASED ON 1.73 SQ M-ARVRAT: 89 ML/MIN (ref 89–?)
HCO3 BLD-SCNC: 36.1 MEQ/L (ref 21–32)
HCT VFR BLD CALC: 42.2 % (ref 35–46)
HEMOGLOBIN A1A: 1.6 %
HEMOGLOBIN A1B: 2 %
HEMOGLOBIN AO: 82.1 %
HEMOGLOBIN LA1C: 3 %
HEMOGLOBIN P3: 4.6 %
MCH RBC QN AUTO: 32.8 PG (ref 27–34)
MCHC RBC AUTO-ENTMCNC: 34.1 % (ref 32–36)
MCV RBC AUTO: 95.9 FL (ref 80–100)
PLATELET # BLD: 134 TH/MM3 (ref 150–450)
POTASSIUM SERPL-SCNC: 3.2 MEQ/L (ref 3.5–5.1)
RBC # BLD AUTO: 4.4 MIL/MM3 (ref 4–5.3)
REVIEW FLAG: (no result)
SODIUM SERPL-SCNC: 140 MEQ/L (ref 136–145)
WBC # BLD AUTO: 19.2 TH/MM3 (ref 4–11)

## 2017-12-04 RX ADMIN — DILTIAZEM HYDROCHLORIDE SCH MG: 60 TABLET, FILM COATED ORAL at 05:37

## 2017-12-04 RX ADMIN — Medication SCH ML: at 08:56

## 2017-12-04 RX ADMIN — FUROSEMIDE SCH MG: 10 INJECTION, SOLUTION INTRAMUSCULAR; INTRAVENOUS at 08:56

## 2017-12-04 RX ADMIN — FAMOTIDINE SCH MG: 20 TABLET, FILM COATED ORAL at 08:55

## 2017-12-04 RX ADMIN — HUMAN INSULIN SCH: 100 INJECTION, SOLUTION SUBCUTANEOUS at 12:37

## 2017-12-04 RX ADMIN — HUMAN INSULIN SCH: 100 INJECTION, SOLUTION SUBCUTANEOUS at 01:30

## 2017-12-04 RX ADMIN — AZITHROMYCIN SCH MLS/HR: 500 INJECTION, POWDER, LYOPHILIZED, FOR SOLUTION INTRAVENOUS at 05:30

## 2017-12-04 RX ADMIN — HUMAN INSULIN SCH: 100 INJECTION, SOLUTION SUBCUTANEOUS at 09:03

## 2017-12-04 RX ADMIN — METHYLPREDNISOLONE SODIUM SUCCINATE SCH MG: 40 INJECTION, POWDER, FOR SOLUTION INTRAMUSCULAR; INTRAVENOUS at 08:56

## 2017-12-04 RX ADMIN — SODIUM CHLORIDE SCH MLS/HR: 900 INJECTION INTRAVENOUS at 04:26

## 2017-12-04 RX ADMIN — TIOTROPIUM BROMIDE SCH MCG: 18 CAPSULE ORAL; RESPIRATORY (INHALATION) at 08:56

## 2017-12-04 RX ADMIN — BUDESONIDE AND FORMOTEROL FUMARATE DIHYDRATE SCH PUFF: 160; 4.5 AEROSOL RESPIRATORY (INHALATION) at 08:56

## 2017-12-04 RX ADMIN — ENOXAPARIN SODIUM SCH MG: 40 INJECTION SUBCUTANEOUS at 05:38

## 2017-12-04 RX ADMIN — DILTIAZEM HYDROCHLORIDE SCH MG: 60 TABLET, FILM COATED ORAL at 12:29

## 2017-12-04 NOTE — EKG
Date Performed: 12/03/2017       Time Performed: 14:33:52

 

PTAGE:      76 years

 

EKG:      Sinus arrhythmia Extensive T wave changes are nonspecific Borderline ECG 

 

NO PREVIOUS TRACING            

 

DOCTOR:   Eric Woody  Interpretating Date/Time  12/04/2017 18:42:20

## 2017-12-04 NOTE — HHI.DS
__________________________________________________





Discharge Summary


Admission Date


Nov 28, 2017 at 05:18


Discharge Date:  Dec 4, 2017


Admitting Diagnosis





copd exacerbation, bronchitis, sepsis, svt





(1) Sepsis


ICD Code:  A41.9 - Sepsis, unspecified organism


Status:  Acute


(2) COPD exacerbation


ICD Code:  J44.1 - Chronic obstructive pulmonary disease with (acute) 

exacerbation


Diagnosis:  Principal


Status:  Resolved


(3) Hypercapnic respiratory failure


ICD Code:  J96.92 - Respiratory failure, unspecified with hypercapnia


Diagnosis:  Principal


Status:  Resolved


(4) Toxic encephalopathy


ICD Code:  G92 - Toxic encephalopathy


Diagnosis:  Principal


Status:  Resolved


(5) Bilateral lower extremity edema


ICD Code:  R60.0 - Localized edema


Diagnosis:  Principal


Status:  Resolved


Procedures


none


Brief History - From Admission


patient is a 77 y/o female with history of COPD, breast cancer and RA, who 

presented to ER with worsening shortness of breath. she says that it started a 

few days ago. it was associated with cough-productive of yellowish sputum. she 

was seen in ER two days ago and was prescribed prednisone and zithromax and 

discharged home. she says that despite taking the medications, her sob 

continued to get worse to the extent that she decided to come back to the 

hospital. she denies any chest pain, fever or chills.she doesn't use oxygen at 

home. she was placed on BiPaP in ER .


CBC/BMP:  


12/4/17 0756                                                                   

             12/4/17 0756





Significant Findings





Laboratory Tests








Test


  12/2/17


05:44 12/2/17


14:29 12/3/17


14:22 12/4/17


07:56


 


Blood Gas HCO3


  39 mmol/L


(22-26) 


  


  


 


 


Blood Gas Base Excess


  14.6 mmol/L


(-2-2) 


  


  


 


 


Arterial Blood pH


  7.48


(7.380-7.420) 


  


  


 


 


Arterial Blood Partial


Pressure CO2 54 mmHg


(38-42) 


  


  


 


 


White Blood Count


  


  15.4 TH/MM3


(4.0-11.0) 17.0 TH/MM3


(4.0-11.0) 19.2 TH/MM3


(4.0-11.0)


 


Neutrophils % (Manual)  88 % (16-70)   


 


Band Neutrophils %  7 % (0-6)   


 


Lymphocytes %  4 % (9-44)   


 


Neutrophils # (Manual)


  


  14.6 TH/MM3


(1.8-7.7) 


  


 


 


Toxic Granulation  1+ (NORMAL)   


 


Blood Urea Nitrogen


  


  21 MG/DL


(7-18) 31 MG/DL


(7-18) 31 MG/DL


(7-18)


 


Random Glucose


  


  228 MG/DL


() 277 MG/DL


() 287 MG/DL


()


 


Total Protein


  


  6.0 GM/DL


(6.4-8.2) 


  


 


 


Albumin


  


  3.0 GM/DL


(3.4-5.0) 


  


 


 


Calcium Level


  


  8.3 MG/DL


(8.5-10.1) 8.4 MG/DL


(8.5-10.1) 


 


 


Potassium Level


  


  2.8 MEQ/L


(3.5-5.1) 


  3.2 MEQ/L


(3.5-5.1)


 


Chloride Level


  


  95 MEQ/L


() 95 MEQ/L


() 97 MEQ/L


()


 


Carbon Dioxide Level


  


  41.9 MEQ/L


(21.0-32.0) 39.8 MEQ/L


(21.0-32.0) 36.1 MEQ/L


(21.0-32.0)


 


Anion Gap  4 MEQ/L (5-15)   


 


Platelet Count


  


  


  142 TH/MM3


(150-450) 134 TH/MM3


(150-450)


 


Estimat Glomerular Filtration


Rate 


  


  75 ML/MIN


(>89) 


 


 


Hemoglobin A1c


  


  


  


  6.3 %


(4.3-6.0)








Imaging





Last Impressions








Chest X-Ray 11/30/17 0600 Signed





Impressions: 





 Service Date/Time:  Thursday, November 30, 2017 04:35 - CONCLUSION: No acute 





 disease.       Sergey Harley MD 


 


CT Angiography 11/28/17 0000 Signed





Impressions: 





 Service Date/Time:  Tuesday, November 28, 2017 17:20 - CONCLUSION:  Diffuse 





 miliary parenchymal lung disease. Potentially reactive central mediastinal and 





 bilateral rosalio are ilene prominence. Differential considerations would include 





 atypical pneumonias, pneumoconiosis, sarcoidosis, metastatic disease. No 





 evidence of pulmonary embolism.      Sergey Johnson MD 








PE at Discharge


GEN:  Lying in bed, lethargic.


HEENT: Oral mucosa is dry. Neck veins are not grossly distended.


LUNGS: Mild expiratory wheezing and few coarse crackles. 


HEART: S1-S2 normal no murmurs


ABDOMEN: Abdomen is obese but soft.


EXTREMITIES:  +2 edema in lower extremities.


NEURO: ALethargic but awakens upon stimulation. moves all extremities.


Pt update on day of discharge


The patient is awake and alert, denies chest pain or shortness of breath.  

Patient is afebrile with stable vital signs.  Potassium trending down at 3.2, 

will be replaced prior to discharge the patient will be discharge on oral 

potassium.


Hospital Course


The patient was initially admitted to the medical floor with acute on chronic 

hypercapnic history failure, respiratory acidosis secondary to COPD 

exacerbation.  The patient was placed on BiPAP, started on IV antibiotics and 

IV steroids, supplemental oxygen, DuoNeb's on BiPAP as needed.  CT pulmonary 

angiogram of the chest showed diffuse miliary disease.  Possibly secondary to 

atypical pneumonia versus a soft tissue and disease associated to the patient's 

rheumatoid arthritis.  Pulmonary consulted.  Dr. thakur followed patient 

throughout hospitalization.  Patient met sepsis criteria on admission with 

leukocytosis and heart rate more than 90 and PCO2 of 60.  The patient was then 

transferred to the intensive care unit later that day and intensivist consult 

was placed.  The patient came off BiPAP on 11/29/17.  Eventually within status 

improved and the patient was transferred to the medical floor.  Once in the 

medical floor the patient was noted to have bilateral lower extremity edema and 

pain shortness of breath, the patient was started on IV Lasix which was 

transitioned to oral.  Patient also noted to be combative and agitated 

overnight when patient transferred out of the unit.  The patient was started on 

Seroquel 50 mg by mouth at bedtime.  Agitation and confusion resolved and the 

patient was mentating back to baseline upon discharge.  The patient felt the 

respiratory home walk test and will be discharge home with home health PT and 

oxygen.


Pt Condition on Discharge:  Stable


Discharge Disposition:  Disch w/ Home Health Serv


Discharge Time:  > 30 minutes


Discharge Instructions


DIET: Follow Instructions for:  Heart Healthy Diet


Activities you can perform:  Regular-No Restrictions, See Additionl Instruction


Other Activity Instructions:  


as per PT instructions.





Out of bed with assistance only.





Please use walker to ambulate.


Follow up Referrals:  


PCP Follow-up - 2 Weeks


Pulmonology - 1 Week Patient will need an echocardiogram if not done at 

hospital.





New Medications:  


Cefuroxime (Ceftin) 250 Mg Tab


250 MG PO BID for Infection, #8 TAB





Furosemide (Lasix) 20 Mg Tab


20 MG PO DAILY for Shortness of Breath, #30 TAB 0 Refills





Prednisone (Prednisone) 20 Mg Tab


20 MG PO AS DIRECTED for Inflammation, #11 TAB 0 Refills


Take 40mg daily for 3 days; 20mg daily for 3 days; 10mg daily for 3


 days, then


 stop.


Walker with Front Wheels (Walker with Front Wheels) 1 Mis Mis


EA .ROUTE AS DIRECTED, #1 0 Refills





Amlodipine (Norvasc) 5 Mg Tab


5 MG PO DAILY for Blood Pressure Management, #31 TAB





Diltiazem (Cardizem) 60 Mg Tab


60 MG PO Q6H for Blood Pressure Management, #120 TAB





Tiotropium Inh (Spiriva Handihaler) 18 Mcg Cap


18 MCG INH DAILY for Shortness of Breath, #31 CAP


1 capsule = 18 mcg


[Budeson-Formot 160-4.5 Mg Inh] () 60 PUFF AERO


2 PUFF INH Q12HR for Shortness of Breath, #1 INHALER





 


Continued Medications:  


Brimonidine Opth Drops (Brimonidine Opth Drops) 0.2% Soln


1 DROP LEFT EYE BID for Intraocular pressure, #1 BOTTLE 0 Refills





Hydroxychloroquine (Plaquenil) 200 Mg Tab


Unknown Dose PO BID, #60 TAB 0 Refills


Take with food


Ipratropium-Albuterol Neb (Duoneb) 0.5-2.5 Mg/3 Ml Neb


1 AMPULE NEB Q6HR NEB for COPD for 30 Days, ML





Paroxetine (Paxil) 10 Mg Tab


Unknown Dose PO DAILY, #30 TAB 0 Refills





Prednisolone Acetate Opth 1% (Pred Forte Opth 1%) 1% Susp


1 DROP LEFT EYE BID for Inflammation, #1 BOTTLE 0 Refills





 


Discontinued Medications:  


Prednisone (Prednisone) 1 Mg Tab


7.5 PO DAILY, TAB 0 Refills





Prednisone (48) 10 mg tab Dose Pack (Prednisone (48) 10 mg tab Dose Pack) 10 Mg 

Dspk


10 MG PO AS DIRECTED for Inflammation, #1 DSPK 0 Refills





Prednisone (Prednisone) 1 Mg Tab


1 MG PO AS DIRECTED, TAB 0 Refills

















Nick Walsh MD Dec 4, 2017 12:38

## 2017-12-04 NOTE — ECHRPT
Indication:   HEART FAILURE

 

 CONCLUSIONS

 Normal left ventricular size. 

 Wall thickness is normal. 

 The left ventricular systolic function is low normal with an estimated ejection fraction in the rang
e of 50-

 55%. 

 Aortic valve sclerosis is present. 

 There is trace tricuspid valve regurgitation. 

 

 

 BP:  164   / 77      HR: 75                       Rhythm:

 

 MEASUREMENTS  (Male / Female) Normal Values       Technical Quality:Fair

 2D ECHO

 LV Diastolic Diameter PLAX        4.4 cm                4.2 - 5.9 / 3.9 - 5.3 cm

 LV Systolic Diameter PLAX         3.4 cm                

 IVS Diastolic Thickness           0.9 cm                0.6 - 1.0 / 0.6 - 0.9 cm

 LVPW Diastolic Thickness          0.6 cm                0.6 - 1.0 / 0.6 - 0.9 cm

 LV Relative Wall Thickness        0.3                   

 RV Internal Dim ED PLAX           2.1 cm                

 LA Systolic Diameter LX           2.9 cm                3.0 - 4.0 / 2.7 - 3.8 cm

 

 M-MODE

 Aortic Root Diameter MM           3.7 cm                

 AV Cusp Separation MM             1.9 cm                

 

 DOPPLER

 LVOT Peak Velocity                175.0 cm/s            

 LVOT Peak Gradient                12.3 mmHg             

 Mitral E Point Velocity           88.8 cm/s             

 Mitral A Point Velocity           110.0 cm/s            

 Mitral E to A Ratio               0.8                   

 TR Peak Velocity                  168.0 cm/s            

 TR Peak Gradient                  11.3 mmHg             

 

 

 FINDINGS

 

 LEFT VENTRICLE

 Normal left ventricular size. 

 Wall thickness is normal. 

 The left ventricular systolic function is low normal with an estimated ejection fraction in the rang
e of 50-

 55%. 

 

 RIGHT VENTRICLE

 Normal right ventricular size and systolic function.  

 

 LEFT ATRIUM

 The left atrial size is normal.  

 

 RIGHT ATRIUM

 The right atrial size is normal.  

 

 ATRIAL SEPTUM

 Normal atrial septal thickness without atrial level shunting by limited color doppler interrogation.
  

 

 AORTA

 The aortic root and proximal ascending aorta are normal in size on limited imaging.  

 

 MITRAL VALVE

 Structurally normal mitral valve. No mitral valve stenosis or regurgitation.  

 

 AORTIC VALVE

 Aortic valve sclerosis is present. 

 

 TRICUSPID VALVE

 There is trace tricuspid valve regurgitation. 

 

 PULMONARY VALVE

 The pulmonary valve is not well visualized.  

 

 VESSELS

 The inferior vena cava is normal in size.  

 

 PERICARDIUM

 No pericardial effusion.  

 

 

 

 

  Larry Blackwell MD

  (Electronically Signed)

  Final Date:04 December 2017 16:06

## 2017-12-04 NOTE — HHI.DCPOC
Discharge Care Plan


Diagnosis:  


(1) COPD exacerbation


(2) Bilateral lower extremity edema


(3) Toxic encephalopathy


(4) Hypercapnic respiratory failure


(5) Congestive heart failure (CHF)


Goals to Promote Your Health


* To prevent worsening of your condition and complications


* To maintain your health at the optimal level


Directions to Meet Your Goals


*** Take your medications as prescribed


*** Follow your dietary instruction


*** Follow activity as directed








*** Keep your appointments as scheduled


*** Take your immunizations and boosters as scheduled


*** If your symptoms worsen call your PCP, if no PCP go to Urgent Care Center 

or Emergency Room***


*** Smoking is Dangerous to Your Health. Avoid second hand smoke***


***Call the 24-hour hour crisis hotline for domestic abuse at 1-304.631.6623***











Nick Walsh MD Dec 4, 2017 12:28

## 2017-12-04 NOTE — HHI.FF
Face to Face Verification


Diagnosis:  


(1) Hypercapnic respiratory failure


(2) Congestive heart failure (CHF)


(3) COPD exacerbation


(4) Rheumatoid arthritis


Physical Therapy


Order:  Improve ambulation, Strength and gait training





Home Health Nursing








Order: Medical education





 Signs/symptoms of disease process





 CHF education





 Nursing assessment with vital signs

















I have seen patient Xiomy Corral on 12/4/17. My clinical findings 

support the need for the requested home health care services because:








 Patient has SOB





 Limited ability to care for self





 Need for psychosocial assistance





 High risk of falls














I certify that my clinical findings support that this patient is homebound 

because:








 Hx COPD- exertion dyspnea/weakness





 Unsteady gait/balance





 Unsafe to leave home unassisted





 Unable to use public transportation

















Nick Walsh MD Dec 4, 2017 12:32

## 2018-01-02 ENCOUNTER — HOSPITAL ENCOUNTER (OUTPATIENT)
Dept: HOSPITAL 17 - PLAB | Age: 78
End: 2018-01-02
Attending: FAMILY MEDICINE
Payer: MEDICARE

## 2018-01-02 DIAGNOSIS — I50.9: Primary | ICD-10-CM

## 2018-01-02 LAB
BUN SERPL-MCNC: 20 MG/DL (ref 7–18)
CALCIUM SERPL-MCNC: 8.4 MG/DL (ref 8.5–10.1)
CHLORIDE SERPL-SCNC: 100 MEQ/L (ref 98–107)
CREAT SERPL-MCNC: 0.68 MG/DL (ref 0.5–1)
GFR SERPLBLD BASED ON 1.73 SQ M-ARVRAT: 84 ML/MIN (ref 89–?)
GLUCOSE SERPL-MCNC: 141 MG/DL (ref 74–106)
HCO3 BLD-SCNC: 29.5 MEQ/L (ref 21–32)
SODIUM SERPL-SCNC: 143 MEQ/L (ref 136–145)

## 2018-01-02 PROCEDURE — 80048 BASIC METABOLIC PNL TOTAL CA: CPT

## 2018-01-02 PROCEDURE — 36415 COLL VENOUS BLD VENIPUNCTURE: CPT

## 2018-04-19 ENCOUNTER — HOSPITAL ENCOUNTER (OUTPATIENT)
Dept: HOSPITAL 17 - HRAD | Age: 78
Discharge: HOME | End: 2018-04-19
Attending: INTERNAL MEDICINE
Payer: MEDICARE

## 2018-04-19 VITALS
HEART RATE: 77 BPM | DIASTOLIC BLOOD PRESSURE: 69 MMHG | SYSTOLIC BLOOD PRESSURE: 125 MMHG | RESPIRATION RATE: 18 BRPM | OXYGEN SATURATION: 96 %

## 2018-04-19 VITALS
HEART RATE: 75 BPM | OXYGEN SATURATION: 93 % | RESPIRATION RATE: 18 BRPM | DIASTOLIC BLOOD PRESSURE: 77 MMHG | SYSTOLIC BLOOD PRESSURE: 155 MMHG

## 2018-04-19 VITALS
HEART RATE: 76 BPM | SYSTOLIC BLOOD PRESSURE: 129 MMHG | RESPIRATION RATE: 18 BRPM | DIASTOLIC BLOOD PRESSURE: 62 MMHG | OXYGEN SATURATION: 95 %

## 2018-04-19 VITALS
RESPIRATION RATE: 18 BRPM | SYSTOLIC BLOOD PRESSURE: 177 MMHG | DIASTOLIC BLOOD PRESSURE: 87 MMHG | HEART RATE: 70 BPM | TEMPERATURE: 98 F | OXYGEN SATURATION: 95 %

## 2018-04-19 VITALS — BODY MASS INDEX: 27.39 KG/M2 | WEIGHT: 170.42 LBS | HEIGHT: 66 IN

## 2018-04-19 VITALS
RESPIRATION RATE: 18 BRPM | HEART RATE: 74 BPM | OXYGEN SATURATION: 96 % | SYSTOLIC BLOOD PRESSURE: 131 MMHG | DIASTOLIC BLOOD PRESSURE: 66 MMHG

## 2018-04-19 VITALS
SYSTOLIC BLOOD PRESSURE: 136 MMHG | HEART RATE: 76 BPM | TEMPERATURE: 97.8 F | DIASTOLIC BLOOD PRESSURE: 55 MMHG | OXYGEN SATURATION: 90 % | RESPIRATION RATE: 16 BRPM

## 2018-04-19 DIAGNOSIS — I10: ICD-10-CM

## 2018-04-19 DIAGNOSIS — J44.9: ICD-10-CM

## 2018-04-19 DIAGNOSIS — M89.9: Primary | ICD-10-CM

## 2018-04-19 PROCEDURE — 20225 BONE BIOPSY TROCAR/NDL DEEP: CPT

## 2018-04-19 PROCEDURE — 88305 TISSUE EXAM BY PATHOLOGIST: CPT

## 2018-04-19 PROCEDURE — 77012 CT SCAN FOR NEEDLE BIOPSY: CPT

## 2018-04-19 PROCEDURE — 88341 IMHCHEM/IMCYTCHM EA ADD ANTB: CPT

## 2018-04-19 PROCEDURE — 88342 IMHCHEM/IMCYTCHM 1ST ANTB: CPT

## 2018-04-19 NOTE — PD.RAD
Post CT Procedure Prog Note


Pre Procedure Diagnosis:  


(1) left sacral mass


Post Procedure Diagnosis:  


(1) left sacral mass


Procedure Date:


Apr 19, 2018


Supervising Radiologist:


Marlon Kessler


Proceduralist/Assist:


anaya baez joanne


Estimated blood loss:


none


Anesthesia:  Conscious Sedation


Plan of Activity


Patient to Unit:  [facility]


Patient Condition:  Good


See PACS Report for procedural detail/treatment











Marlon Kessler MD Apr 19, 2018 12:29

## 2018-04-20 NOTE — RADRPT
EXAM DATE/TIME:  04/19/2018 11:56 

 

[facility] COMPARISON:     

No previous studies available for comparison.

 

INDICATIONS :      

Bone lesion.

  

 

SEDATION TIME:       

30 minutes

 

 

BIOPSY SITE:       

Left Sacrum

                     

 

MEDICATION(S):

1.) 3 mg midazolam (Versed) IV  

2.) 150 mcg fentanyl (Sublimaze) IV  

 

 

DEVICE(S):

1.) 12 gauge Bone biopsy needle 

2.) 18 gauge Temno core biopsy needle 

                     

 

MEDICAL HISTORY :     

Chronic obstructive pulmonary disease. Hypertension.  Bone Mets

 

SURGICAL HISTORY :     

None.   

 

ENCOUNTER:     

Initial

 

ACUITY:     

1 day

 

PAIN SCORE:     

0/10

 

LOCATION:     

Left Sacrum 

                     

A total of three core specimen(s) were obtained and sent to the laboratory for pathologic evaluation.


 

 

PROCEDURE:

1.   CT guided bone deep biopsy.

 

Prior to the procedure informed consent was obtained.  Any appropriate prior imaging studies were rev
iewed. 

Using automated exposure control and adjustment of the mA and/or kV according to patient size, radiat
ion dose was kept as low as reasonably achievable to obtain optimal diagnostic quality images.  DICOM
 format image data is available electronically for review and comparison.   

 

 

The site was prepped in a sterile fashion.  Full sterile technique was used, including cap, mask, pollo
rile gloves and gown and a large sterile sheet.  Hand hygiene and 2% chlorhexidine and/or betadine/al
cohol prep was utilized per protocol for cutaneous antisepsis.  The skin and subcutaneous tissues wer
e infiltrated with local anesthetic solution.

 

With CT guidance the previously identified target was localized. Biopsy was performed using the presc
ribed needle as above.  Adequate hemostasis was obtained with compression at the puncture site.

 

Follow-up CT scan reveals no hemorrhage.

 

The patient tolerated the procedure well and there were no complications. The patient was returned to
 the Radiology Outpatient Unit in stable condition.

 

CONCLUSION:     

Uncomplicated CT guided biopsy of expansile left sacral mass.

 

 

 

 Marlon Kessler MD on April 20, 2018 at 7:45           

Board Certified Radiologist.

 This report was verified electronically.

## 2018-05-19 ENCOUNTER — HOSPITAL ENCOUNTER (EMERGENCY)
Dept: HOSPITAL 17 - NEPC | Age: 78
Discharge: HOME | End: 2018-05-19
Payer: MEDICARE

## 2018-05-19 VITALS
HEART RATE: 80 BPM | SYSTOLIC BLOOD PRESSURE: 187 MMHG | TEMPERATURE: 98.7 F | RESPIRATION RATE: 18 BRPM | OXYGEN SATURATION: 95 % | DIASTOLIC BLOOD PRESSURE: 88 MMHG

## 2018-05-19 VITALS — WEIGHT: 169.76 LBS | HEIGHT: 66 IN | BODY MASS INDEX: 27.28 KG/M2

## 2018-05-19 DIAGNOSIS — G89.3: Primary | ICD-10-CM

## 2018-05-19 DIAGNOSIS — C79.51: ICD-10-CM

## 2018-05-19 DIAGNOSIS — Z85.3: ICD-10-CM

## 2018-05-19 DIAGNOSIS — J44.9: ICD-10-CM

## 2018-05-19 PROCEDURE — 99283 EMERGENCY DEPT VISIT LOW MDM: CPT

## 2018-05-19 NOTE — PD
HPI


Chief Complaint:  Medication Refill Request


Time Seen by Provider:  03:16


Travel History


International Travel<30 days:  No


Contact w/Intl Traveler<30days:  No


Traveled to known affect area:  No





History of Present Illness


HPI


This is a 77-year-old female who has a history of breast cancer with bony 

metastasis who presents to the emergency department with increasing pain.  She 

describes severe throbbing pain in her low back and sacral area, constant, 

moderate severity with no associated weakness, numbness or bowel or bladder 

incontinence.  She says she has had this pain ever since she saw Dr. Reyes 

at her last oncology appointment.  At that appointment Dr. Reyes prescribed 

her oxycodone.  It took her over a month to take 20 total tablets but she ran 

out of her prescription yesterday and she says she was up all night in severe 

pain.





PFSH


Past Medical History


Hx Anticoagulant Therapy:  No


Anxiety:  Yes


Cancer:  Yes (Breast Cancer on Right side , Sacrum, R femur)


Cardiovascular Problems:  No


Congestive Heart Failure:  No


COPD:  Yes


Coronary Artery Disease:  No


Diabetes:  No


Diminished Hearing:  No


Endocrine:  No


Genitourinary:  No


Hepatitis:  No


Hiatal Hernia:  No


Immune Disorder:  No


Medical other:  No


Musculoskeletal:  No


Neurologic:  No


Psychiatric:  Yes (anxiety/depression)


Reproductive:  No


Respiratory:  Yes (COPD, Asthma)


Pneumonia:  Yes


Thyroid Disease:  No


Tetanus Vaccination:  < 5 Years


Influenza Vaccination:  No


Pregnant?:  Not Pregnant


Menopausal:  Yes





Past Surgical History


Abdominal Surgery:  No


AICD:  No


Cardiac Surgery:  No


 Section:  Yes (X2)


Ear Surgery:  No


Endocrine Surgery:  No


Eye Surgery:  Yes (detached retina repair 10/2017)


Genitourinary Surgery:  No


Gynecologic Surgery:  No


Hysterectomy:  No


Joint Replacement:  No


Neurologic Surgery:  Yes (DISCECTOMY)


Oral Surgery:  Yes (Tonsilectomy at age 7)


Pacemaker:  No


Thoracic Surgery:  No


Tonsillectomy:  Yes


Other Surgery:  Yes (LUMPECTOMY RIGHT BREAST)





Social History


Alcohol Use:  Yes (WINE 1-2 GLASSES WITH DINNER)


Tobacco Use:  No


Substance Use:  No





Allergies-Medications


(Allergen,Severity, Reaction):  


Coded Allergies:  


     shellfish derived (Unverified  Allergy, Severe, Anaphylaxis, 18)


Reported Meds & Prescriptions





Reported Meds & Active Scripts


Active


Oxygen (O2)  Device Liter EDUAR.CANULA CONTINUOUS


     Oxygen Concentrator Portable Gaseous


     2 L/min via Nasal Canula Continuous


     For 99 months


Cardizem (Diltiazem HCl) 60 Mg Tab 60 Mg PO Q6H


Spiriva Handihaler (Tiotropium Inh) 18 Mcg Cap 18 Mcg INH DAILY


     1 capsule = 18 mcg


Duoneb (Ipratropium-Albuterol Neb) 0.5-2.5 Mg/3 Ml Neb 1 Ampule NEB Q6HR NEB 30 

Days


Reported


Symbicort Inh (Budesonide/Formoterol Fumarate) 160-4.5 Mcg/Act Aero 2 Puff INH 

Q12HR


Ibuprofen 200 Mg Tab 200 Mg PO TID PRN


Prednisone 10 Mg Tab 7.5 Mg PO DAILY


Paxil (Paroxetine HCl) 10 Mg Tab Unknown Dose PO DAILY


Plaquenil (Hydroxychloroquine Sulfate) 200 Mg Tab Unknown Dose PO BID


     Take with food








Review of Systems


Except as stated in HPI:  all other systems reviewed are Neg





Physical Exam


Narrative


GENERAL:Well appearing, no acute distress


SKIN: Focused skin assessment warm and dry.


HEAD: Atraumatic. Normocephalic. 


EYES: Pupils equal and round.  No injection or drainage. 


ENT:  Moist mucous membranes


NECK: Trachea midline. 


CARDIOVASCULAR: Regular rate and rhythm.  No murmur appreciated.


RESPIRATORY: Mild diffuse wheezing with no increased work of breathing.


GASTROINTESTINAL: Abdomen soft, non-tender, nondistended. 


MUSCULOSKELETAL: Tender to palpation over the SI joint.


NEUROLOGICAL: Awake and alert. No obvious cranial nerve deficits.   5 out of 5 

strength in the bilateral upper and lower extremities.


PSYCHIATRIC: Appropriate mood and affect; insight and judgment normal.





Data


Data


Last Documented VS





Vital Signs








  Date Time  Temp Pulse Resp B/P (MAP) Pulse Ox O2 Delivery O2 Flow Rate FiO2


 


18 02:08 98.7 80 18 187/88 (121) 95   











MDM


Medical Decision Making


Medical Screen Exam Complete:  Yes


Emergency Medical Condition:  Yes


Differential Diagnosis


Cancer pain, spinal cord compression


Narrative Course


This is a 77-year-old female who presents to the emergency department with pain 

in the setting of bony metastasis.  She has a normal neurologic exam and no 

neurologic symptoms.  Her pain is likely secondary to a known sacral mass.  

Patient will be discharged on oxycodone and was asked to follow-up with Dr. Reyes in clinic.





Diagnosis





 Primary Impression:  


 Cancer related pain


Patient Instructions:  General Instructions





***Additional Instructions:  


If you develop numbness, weakness, loss of your bowels or bladder return to the 

emergency room.


***Med/Other Pt SpecificInfo:  Prescription(s) given


Scripts


Oxycodone (Oxycodone) 5 Mg Cap


5 MG PO Q6H Y for PAIN, #12 CAP 0 Refills


   Prov: Annelise Madera MD         18


Disposition:  01 DISCHARGE HOME


Condition:  Stable











Annelise Madera MD May 19, 2018 04:01

## 2018-05-24 ENCOUNTER — HOSPITAL ENCOUNTER (EMERGENCY)
Dept: HOSPITAL 17 - NEPC | Age: 78
Discharge: HOME | End: 2018-05-24
Payer: MEDICARE

## 2018-05-24 VITALS — HEIGHT: 66 IN | WEIGHT: 176.37 LBS | BODY MASS INDEX: 28.34 KG/M2

## 2018-05-24 VITALS
SYSTOLIC BLOOD PRESSURE: 185 MMHG | OXYGEN SATURATION: 96 % | HEART RATE: 82 BPM | RESPIRATION RATE: 18 BRPM | DIASTOLIC BLOOD PRESSURE: 82 MMHG

## 2018-05-24 VITALS
RESPIRATION RATE: 18 BRPM | DIASTOLIC BLOOD PRESSURE: 79 MMHG | TEMPERATURE: 98.6 F | HEART RATE: 93 BPM | SYSTOLIC BLOOD PRESSURE: 197 MMHG | OXYGEN SATURATION: 94 %

## 2018-05-24 DIAGNOSIS — R11.0: ICD-10-CM

## 2018-05-24 DIAGNOSIS — F32.9: ICD-10-CM

## 2018-05-24 DIAGNOSIS — Z85.3: ICD-10-CM

## 2018-05-24 DIAGNOSIS — D69.6: ICD-10-CM

## 2018-05-24 DIAGNOSIS — C41.4: ICD-10-CM

## 2018-05-24 DIAGNOSIS — Z79.899: ICD-10-CM

## 2018-05-24 DIAGNOSIS — G89.3: Primary | ICD-10-CM

## 2018-05-24 DIAGNOSIS — J44.9: ICD-10-CM

## 2018-05-24 LAB
ALBUMIN SERPL-MCNC: 3.7 GM/DL (ref 3.4–5)
ALP SERPL-CCNC: 75 U/L (ref 45–117)
ALT SERPL-CCNC: 17 U/L (ref 10–53)
AST SERPL-CCNC: 20 U/L (ref 15–37)
BASOPHILS # BLD AUTO: 0 TH/MM3 (ref 0–0.2)
BASOPHILS NFR BLD: 0.5 % (ref 0–2)
BILIRUB SERPL-MCNC: 0.6 MG/DL (ref 0.2–1)
BUN SERPL-MCNC: 17 MG/DL (ref 7–18)
CALCIUM SERPL-MCNC: 8.8 MG/DL (ref 8.5–10.1)
CHLORIDE SERPL-SCNC: 101 MEQ/L (ref 98–107)
CREAT SERPL-MCNC: 0.64 MG/DL (ref 0.5–1)
EOSINOPHIL # BLD: 0.3 TH/MM3 (ref 0–0.4)
EOSINOPHIL NFR BLD: 6.1 % (ref 0–4)
ERYTHROCYTE [DISTWIDTH] IN BLOOD BY AUTOMATED COUNT: 13.7 % (ref 11.6–17.2)
GFR SERPLBLD BASED ON 1.73 SQ M-ARVRAT: 90 ML/MIN (ref 89–?)
GLUCOSE SERPL-MCNC: 116 MG/DL (ref 74–106)
HCO3 BLD-SCNC: 32.6 MEQ/L (ref 21–32)
HCT VFR BLD CALC: 39.2 % (ref 35–46)
HGB BLD-MCNC: 13.3 GM/DL (ref 11.6–15.3)
LYMPHOCYTES # BLD AUTO: 0.4 TH/MM3 (ref 1–4.8)
LYMPHOCYTES NFR BLD AUTO: 8.5 % (ref 9–44)
MCH RBC QN AUTO: 31.5 PG (ref 27–34)
MCHC RBC AUTO-ENTMCNC: 33.9 % (ref 32–36)
MCV RBC AUTO: 92.9 FL (ref 80–100)
MONOCYTE #: 0.7 TH/MM3 (ref 0–0.9)
MONOCYTES NFR BLD: 17.7 % (ref 0–8)
NEUTROPHILS # BLD AUTO: 2.8 TH/MM3 (ref 1.8–7.7)
NEUTROPHILS NFR BLD AUTO: 67.2 % (ref 16–70)
PLATELET # BLD: 110 TH/MM3 (ref 150–450)
PMV BLD AUTO: 9.5 FL (ref 7–11)
PROT SERPL-MCNC: 6.5 GM/DL (ref 6.4–8.2)
RBC # BLD AUTO: 4.21 MIL/MM3 (ref 4–5.3)
SODIUM SERPL-SCNC: 140 MEQ/L (ref 136–145)
WBC # BLD AUTO: 4.2 TH/MM3 (ref 4–11)

## 2018-05-24 PROCEDURE — 96374 THER/PROPH/DIAG INJ IV PUSH: CPT

## 2018-05-24 PROCEDURE — 85025 COMPLETE CBC W/AUTO DIFF WBC: CPT

## 2018-05-24 PROCEDURE — 99284 EMERGENCY DEPT VISIT MOD MDM: CPT

## 2018-05-24 PROCEDURE — 80053 COMPREHEN METABOLIC PANEL: CPT

## 2018-05-24 NOTE — PD
HPI


Chief Complaint:  Pain: Acute or Chronic


Time Seen by Provider:  02:22


Travel History


International Travel<30 days:  No


Contact w/Intl Traveler<30days:  No


Traveled to known affect area:  No





History of Present Illness


HPI


76yo F with PMH of bone cancer in her sacrum here with c/o worsening bone pain.

  Pt said she went to radiation therapy this week for 3 days and is now in 

excruciating pain.  Also with nausea.  Pt had radiation therapy last week and 

needed to come to the ED on  for pain.  Pt said she took her oxycodone and 

it didnt work.  Denies any fever, chest pain, sob, n/v, abdominal pain, focal 

weakness or numbness.  Pt's oncologist is Dr. Reyes.





PFSH


Past Medical History


Hx Anticoagulant Therapy:  No


Anxiety:  Yes


Cancer:  Yes (Breast Cancer on Right side , Sacrum, R femur)


Cardiovascular Problems:  No


Congestive Heart Failure:  No


COPD:  Yes


Coronary Artery Disease:  No


Diabetes:  No


Diminished Hearing:  No


Endocrine:  No


Gastrointestinal Disorders:  No


Genitourinary:  No


Hepatitis:  No


Hiatal Hernia:  No


Hypertension:  No


Immune Disorder:  No


Medical other:  No


Musculoskeletal:  No


Neurologic:  No


Psychiatric:  Yes (anxiety/depression)


Reproductive:  No


Respiratory:  Yes (COPD, Asthma)


Pneumonia:  Yes


Thyroid Disease:  No


Menopausal:  Yes





Past Surgical History


Abdominal Surgery:  No


AICD:  No


Cardiac Surgery:  No


 Section:  Yes (X2)


Ear Surgery:  No


Endocrine Surgery:  No


Eye Surgery:  Yes (detached retina repair 10/2017)


Genitourinary Surgery:  No


Gynecologic Surgery:  No


Hysterectomy:  No


Joint Replacement:  No


Neurologic Surgery:  Yes (DISCECTOMY)


Oral Surgery:  Yes (Tonsilectomy at age 7)


Pacemaker:  No


Thoracic Surgery:  No


Tonsillectomy:  Yes


Other Surgery:  Yes (LUMPECTOMY RIGHT BREAST)





Social History


Alcohol Use:  Yes (WINE 1-2 GLASSES WITH DINNER)


Tobacco Use:  No


Substance Use:  No





Allergies-Medications


(Allergen,Severity, Reaction):  


Coded Allergies:  


     shellfish derived (Unverified  Allergy, Severe, Anaphylaxis, 18)


Reported Meds & Prescriptions





Reported Meds & Active Scripts


Active


Oxycodone (Oxycodone HCl) 5 Mg Cap 5 Mg PO Q6H PRN


Oxygen (O2)  Device Liter EDUAR.CANULA CONTINUOUS


     Oxygen Concentrator Portable Gaseous


     2 L/min via Nasal Canula Continuous


     For 99 months


Cardizem (Diltiazem HCl) 60 Mg Tab 60 Mg PO Q6H


Duoneb (Ipratropium-Albuterol Neb) 0.5-2.5 Mg/3 Ml Neb 1 Ampule NEB Q6HR NEB 30 

Days


Reported


Proair Hfa 8.5 GM Inh (Albuterol Sulfate) 90 Mcg/Act Aer 2 Puff INH Q4-6H PRN


     108 mcg/actuation


Incruse Ellipta Inh (Umeclidinium Bromide Inh) 0.0625 Mg/Act Inh 62.5 Mcg INH 

DAILY


Ibuprofen 200 Mg Tab 200 Mg PO TID PRN


Prednisone 10 Mg Tab 7.5 Mg PO DAILY


Paxil (Paroxetine HCl) 10 Mg Tab Unknown Dose PO DAILY


Plaquenil (Hydroxychloroquine Sulfate) 200 Mg Tab Unknown Dose PO BID


     Take with food








Review of Systems


Except as stated in HPI:  all other systems reviewed are Neg





Physical Exam


Narrative


GENERAL: 76yo F in mild distress.


SKIN: Focused skin assessment warm/dry.


HEAD: Atraumatic. Normocephalic. 


EYES: Pupils equal and round. No scleral icterus. No injection or drainage. 


ENT: No nasal bleeding or discharge.  Mucous membranes pink and moist.


NECK: Trachea midline. No JVD. 


CARDIOVASCULAR: Regular rate and rhythm.  No murmur appreciated.


RESPIRATORY: No accessory muscle use. Clear to auscultation. Breath sounds 

equal bilaterally. 


GASTROINTESTINAL: Abdomen soft, non-tender, nondistended.


BACK: No midline ttp thoracic or lumbar spine.  +TTP sacrum. 


MUSCULOSKELETAL: No obvious deformities. No clubbing.  No cyanosis.  No edema. 


NEUROLOGICAL: Awake and alert. No obvious cranial nerve deficits.  Motor 

grossly within normal limits. Normal speech.


PSYCHIATRIC: Appropriate mood and affect; insight and judgment normal.





Data


Data


Last Documented VS





Vital Signs








  Date Time  Temp Pulse Resp B/P (MAP) Pulse Ox O2 Delivery O2 Flow Rate FiO2


 


18 02:50   16     


 


18 01:58 98.6 93  197/79 (118) 94   








Orders





 Orders


Complete Blood Count With Diff (18 02:31)


Comprehensive Metabolic Panel (18 02:31)


Morphine Inj (Morphine Inj) (18 02:45)


Ondansetron  Odt (Zofran  Odt) (18 02:45)





Labs





Laboratory Tests








Test


  18


02:40


 


White Blood Count 4.2 TH/MM3 


 


Red Blood Count 4.21 MIL/MM3 


 


Hemoglobin 13.3 GM/DL 


 


Hematocrit 39.2 % 


 


Mean Corpuscular Volume 92.9 FL 


 


Mean Corpuscular Hemoglobin 31.5 PG 


 


Mean Corpuscular Hemoglobin


Concent 33.9 % 


 


 


Red Cell Distribution Width 13.7 % 


 


Platelet Count 110 TH/MM3 


 


Mean Platelet Volume 9.5 FL 


 


Neutrophils (%) (Auto) 67.2 % 


 


Lymphocytes (%) (Auto) 8.5 % 


 


Monocytes (%) (Auto) 17.7 % 


 


Eosinophils (%) (Auto) 6.1 % 


 


Basophils (%) (Auto) 0.5 % 


 


Neutrophils # (Auto) 2.8 TH/MM3 


 


Lymphocytes # (Auto) 0.4 TH/MM3 


 


Monocytes # (Auto) 0.7 TH/MM3 


 


Eosinophils # (Auto) 0.3 TH/MM3 


 


Basophils # (Auto) 0.0 TH/MM3 


 


CBC Comment DIFF FINAL 


 


Differential Comment  


 


Blood Urea Nitrogen 17 MG/DL 


 


Creatinine 0.64 MG/DL 


 


Random Glucose 116 MG/DL 


 


Total Protein 6.5 GM/DL 


 


Albumin 3.7 GM/DL 


 


Calcium Level 8.8 MG/DL 


 


Alkaline Phosphatase 75 U/L 


 


Aspartate Amino Transf


(AST/SGOT) 20 U/L 


 


 


Alanine Aminotransferase


(ALT/SGPT) 17 U/L 


 


 


Total Bilirubin 0.6 MG/DL 


 


Sodium Level 140 MEQ/L 


 


Potassium Level 3.9 MEQ/L 


 


Chloride Level 101 MEQ/L 


 


Carbon Dioxide Level 32.6 MEQ/L 


 


Anion Gap 6 MEQ/L 


 


Estimat Glomerular Filtration


Rate 90 ML/MIN 


 











[facility]


Medical Decision Making


Medical Screen Exam Complete:  Yes


Emergency Medical Condition:  Yes


Differential Diagnosis


Malignancy pain vs. tumor lysis syndrome vs. dehydration vs. electrolyte 

abnormality


Narrative Course


76yo F with worsening sacral pain after radiation therapy.  Pt given morphine 

and zofran with improvement of pain and nausea.  Labs reviewed, no 

leukocytosis.  H/H normal.  Mild thrombocytopenia.  CMP unremarkable.  Pt feels 

better and wants to go home.  Return precautions given.





Diagnosis





 Primary Impression:  


 Cancer associated pain


Patient Instructions:  General Instructions


Departure Forms:  Tests/Procedures





***Additional Instructions:  


Please follow up with Dr. Reyes as outpatient.  Please take the oxycodone 

that you have as needed for pain.  Return to the ED if symptoms worsen.


***Med/Other Pt SpecificInfo:  No Change to Meds


Disposition:  01 DISCHARGE HOME


Condition:  Stable











WanCoralOfe DO May 24, 2018 02:51

## 2021-10-13 NOTE — MB
cc:

JANNETTE CASEY MD, R. STEVEN M.D.

****

 

 

DATE OF CONSULTATION:

11/28/2017

 

REASON FOR CONSULTATION:

Pulmonary consultation.

 

HISTORY

Ms. 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

asthmatic component.  I have followed for several years now.  Pulmonary

functions were last done in April of this year which time she had an FEV-1 of

50%.  She was a former smoker of about 40-50 pack-years she quit smoking and

1990.  She has had a CT scan as recently as August of this year which revealed

some mild nodularity.  Nothing progressive nothing to suspect malignancy and no

other infiltrative changes.

 

She had recently taken a cruise with her .  She became ill during a

cruise and actually was treated and kept in the clinic area until she returned

to [place] on Sunday.  They returned home and immediately came to the emergency

room where she was seen treated with bronchodilators and antibiotics and sent

home with prednisone and antibiotics.

 

However over the next 24-48 hours she only got worse despite the fact that she

had a nebulizer at home and uses Spiriva regularly. She came back to the

hospital and was in respiratory failure.  Placed on BiPap in the ER and

admitted to the floor.  Her initial blood gases on BiPap revealed a pH of 7.26,

a pCO2 of 16, a pO2 of 126 on about 50%.

 

Overnight she was fairly stable on the BiPap mask.  Arterial blood gases were

done again earlier today continued respiratory acidosis with a pCO2 in the 60s

and a pH of seven to five.  At the time of the interview.  She was fairly

comfortable at rest but breathing at 24-28 times per minute.  She had no chest

pain.  She had cough with purulent sputum.  A chest x-ray was done on admission

which was clear.  I last saw Ms. Corral in August at which time she was

stable.

 

 

 

 

 

 

PAST MEDICAL HISTORY

Breast cancer in 2009 without recurrence

Rheumatoid arthritis followed by Dr. Liao since 2000.  She is on Plaquenil and

prednisone.

No significant prior cardiovascular disease.

She has had back surgery of the LS spine for degenerative disks two C-sections

years ago

She has had pneumonia in the past but never hospitalized.

 

ALLERGIES TO MEDICATIONS

IODINE.

At home She is normally on 7.5 mg of prednisone with the Plaquenil for

rheumatoid disease.

She uses Spiriva inhaler

Nebulizer p.r.n.

Paxil

Multiple vitamins.

 

SOCIAL HISTORY

She is  living with her  who is at the bedside today.  They

occasionally drink wine not to excess. Smoking years ago.  No unusual animal

exposures.  Previous employment in clerical work and management.

 

PHYSICAL EXAMINATION

IN GENERAL/VITAL SIGNS:  The patient is fairly comfortable at rest.  She is on

a BiPap machine but breathing at 24-28 times per minute O2 sats are 98%, pulse

rate 120, blood pressure 160/80, but is high as 200/95.

HEAD, EYES, EARS, NOSE, AND THROAT: Oral mucosa is not examined.

she is on BiPAP. Neck veins are not grossly distended.

LUNGS: She has diffuse wheezing in both lungs with prolonged expiration.

HEART:  Rapid heart rate but regular.

ABDOMEN: Abdomen is obese but soft.

EXTREMITIES:  She has very minimal lower extremity edema.  No calf tenderness.

 

LABORATORY FINDINGS:

White count is elevated 21,000, hemoglobin is 15.  She has a left shift with 12

bands ABGs noted above.  BUN is elevated 39, creatinine 0.6, random sugar 180.

 

 

 

 

 

 

ASSESSMENT AND PLAN:

Mrs. Corral is in respiratory failure acute hypercarbic with severe underlying

COPD.  She also has a marked elevation in white count, although chest x-ray is

clear at lactate level is a bit elevated BUN is elevated, I am concerned she

may be septic as well.  She has received Rocephin and Zithromax.  She is on

aerosolized bronchodilators and steroids as well.

 

In light of her condition and failure to improve arterial blood gases I have

spoken to Dr. Zamora from critical care and asked them to transfer her to

their service in the intensive care unit for careful monitoring at least the

next 24-48 hours.  She certainly is in a position when things could get worse

here.

I have spoken to her  at the bedside as well.

 

I am going to increase her aerosol treatments and be a dose of corticosteroids

and she is on prednisone chronically.  Further diagnostic and/or therapeutic

intervention will depend on her on response and ongoing clinical course.

 

 

                              _________________________________

                              R. MD AZAM Swartz/ronnell

D:  11/28/2017/3:20 PM

T:  11/28/2017/4:43 PM

Visit #:  [id number]

Job #:  [id number]
cc:

LINN BYRNES M.D.

****

 

 

DATE OF CONSULTATION:

11/28/2017

 

YOB: 1940

 

REASON FOR CONSULTATION:

The patient is a 76-year-old female with a past medical history of chronic

obstructive pulmonary disease being followed by Dr. Oglesby her outpatient

pulmonologist, for breast cancer, rheumatoid arthritis on Plaquenil who

presented to [facility] emergency department early this morning

with a 1-week history of progressive worsening shortness of breath, associated

with productive cough.

 

The patient denies any constitutional symptoms.  She was seen in the ED 2 days

ago and was given prescription for prednisone and Zithromax and discharged

home.  Due to worsening of her symptoms see lactate to come back for further

evaluation and management of her symptoms.

 

The patient states that she just came back from a 1-week cruise.  She denies

any associated symptoms of chest pain, orthopnea, PND or edema of lower

extremities.  The patient denies any use of home oxygen or any prior history of

intubations.  She is on nebulizers and bronchodilators at home in addition to

prednisone.

She was placed on BiPap 12/05 with 40% FIO2 and her initial ABG showed acute

hypercapnic respiratory acidosis with a pH of 7.26, CO2 60, pAO2 126, bicarb

26, sats 97%.  The patient had multiple blood gases since then without any

significant changes in her respiratory acidosis.

 

Her laboratory data significant for leukocytosis with a WBC of 21.8 and lactic

acid level was measured at 2.1.  A chest x-ray on arrival showed no evidence of

any acute disease.   The patient was seen by Dr. Oglesby from a pulmonary

service.  She is being transferred to [facility] for closer observation.  Critical

care medicine was consulted for critical care and management.

 

 

 

 

 

When seen the patient was sitting up in bed on BiPap 15/5 with 30% FIO2.

 

PAST MEDICAL HISTORY:

1. Past medical history significant for Chronic obstructive pulmonary disease.

 

2. Breast cancer.

3. Rheumatoid arthritis.

 

PAST SURGICAL HISTORY

1. Previous lumpectomy.

2. Previous back surgery.

 

FAMILY HISTORY

Mother had throat cancer.

 

SOCIAL HISTORY

Quit smoking 30 years ago.  He use to smoke two packs per day.

 

ALLERGIES

SHELLFISH

 

MEDICATIONS

medications include

1. Prednisone

2. Paxil

3. Duoneb

4. Plaquenil

 

REVIEW OF SYSTEMS

As per history of present illness, Rest of the review of systems unremarkable.

 

PHYSICAL EXAMINATION:

IN GENERAL:  A 76-year-old female sitting up in bed in mild distress on BiPap.

 

VITAL SIGNS: Temperature 98.0, heart rate of 108, respiratory rate of 25, blood

pressure 180/82, saturation 96% on a BiPap 10/5 30% FIO2.

HEAD, EYES, EARS, NOSE, AND THROAT: Atraumatic, normocephalic pupil equal and

reactive to accommodation, extraocular muscles intact.  Conjunctivae pink.

Nonicteric sclerae.  Oral mucosa within normal limits.

NECK: Supple.  No jugular venous distention, adenopathy or thyromegaly.

Trachea midline

CARDIOVASCULAR SYSTEM: Tachycardiac normal S1-S2.  No murmurs, rubs or gallops

noted.

PULMONARY: Pulmonary exam bilateral equal entry with few coarse breath sounds.

Scattered wheezing.

ABDOMEN: Soft, nontender, no distension.  Positive bowel sounds.  EXTREMITIES:

No cyanosis, or edema.

NEUROLOGIC: No focal sensory deficit.

 

LABORATORY DATA

Sodium 141, potassium 3.9, chloride 106, CO2 28, BUN 39, creatinine 0.67,

glucose 179, lactic acid 2.1, AST 44, ALT 50, total bilirubin 0.5, alk phos

135, albumin 3.3,

 

WBC 21.8, hemoglobin 15.4, hematocrit 45, platelet count 244.

 

Chest x-ray and no evidence of acute cardiopulmonary disease.

 

Electrocardiogram sinus tachycardia with heart rate of 112 beats per minute.

 

IMPRESSION

1.  Acute hypercapnic respiratory failure.

2.  Chronic obstructive pulmonary disease exacerbation.

3.  Leukocytosis with bandemia.

4.  History of rheumatoid arthritis on Plaquenil.

5.  Breast CA status post radiation treatment and lumpectomy.

6.  Hypertension.

 

RECOMMENDATIONS

1.  Monitor neuro status closely and avoid any sedatives.

2.  Continue with oxygen maintain sats above 92%.

3.  Bronchodilators in the form of DuoNeb q. 4+ q. 2 p.r.n. for shortness of

breath.

4.  Continue with IV steroids.

5.  The patient's on Solu-Medrol 60 mg IV q. six.

6.  Noninvasive positive pressure ventilation.  Will repeat ABG.  if there is

any worsening in respiratory status or clinical condition we will proceed with

intubation and mechanical ventilation.

7.  We will obtain a CT pulmonary angiogram of the chest to rule out pulmonary

embolism.

8.  Chronic lung disease in the seen in the setting of Plaquenil use.

9.  Monitor heart rate and blood pressure closely and maintain MAP greater 65

mmHg.

10. She was given 3 liters of crystalloids earlier.  Will place on maintenance

fluids NS at 50 an hour.

11. Monitor renal function Is and Os and electrolyte replacement per protocol.

 

12. Keep n.p.o. for now until respiratory status improves and we will place on

Pepcid 10 mg IV q. 12.

13. Continue with antibiotics in the form of Rocephin and Zithromax and monitor

for signs infections which include fever and WBC.

14.  Follow up on blood culture from earlier today.

15.  Monitor CBC.

16.  We will place on sliding scale insulin with Accu-Chek to maintain

euglycemia as the patient is on IV steroids.

17.  GI prophylaxis will place on Pepcid 10 mg q. 12

18.  DVT prophylaxis with SCDs

19.  We will continue with Lovenox 40 mg Subcu daily.

20.  The case discussed with Dr. Oglesby from a pulmonary service.

 

 

 

 

                              _________________________________

                              MD URI Lemus/ronnell

D:  11/28/2017/1:30 PM

T:  11/28/2017/1:46 PM

Visit #:  [id number]

Job #:  [id number]
none

## 2024-08-30 NOTE — HHI.PR
Airway       Patient location during procedure: OR  Staff -        Performed By: CRNA  Consent for Airway        Urgency: elective  Indications and Patient Condition       Indications for airway management: yoly-procedural       Induction type:intravenous       Mask difficulty assessment: 0 - not attempted    Final Airway Details       Final airway type: supraglottic airway    Supraglottic Airway Details        Type: LMA       LMA size: 5    Post intubation assessment        Placement verified by: capnometry, equal breath sounds and chest rise        Number of attempts at approach: 1       Number of other approaches attempted: 0       Secured with: tape       Ease of procedure: easy       Dentition: Intact         Subjective


Remarks


as per RN patient is having periods of confusion.


afebrile


c/o edema in lower extremities and thinks that it is because she was sitiing 

with her feet down.





Objective


Vitals





Vital Signs








  Date Time  Temp Pulse Resp B/P (MAP) Pulse Ox O2 Delivery O2 Flow Rate FiO2


 


12/1/17 12:08 97.4 84 19 149/67 (94) 95   


 


12/1/17 08:08 97.4 85 19 121/68 (85) 95   


 


12/1/17 08:00     95 Nasal Cannula 2.00 


 


12/1/17 07:54     96 Nasal Cannula 2.00 


 


12/1/17 06:00    172/80 (110)    


 


12/1/17 04:00 98.3 78 20 195/84 (121) 96   


 


12/1/17 02:00  90      


 


12/1/17 02:00  84      


 


12/1/17 00:00 98.0 86 20 163/72 (102) 92   


 


12/1/17 00:00  86      


 


11/30/17 22:00  79      


 


11/30/17 20:39     99 Nasal Cannula 3.00 


 


11/30/17 20:00  73      


 


11/30/17 20:00 98.9 74 20 169/77 (107) 100   


 


11/30/17 20:00     100 Nasal Cannula 1.00 














I/O      


 


 11/30/17 11/30/17 11/30/17 12/1/17 12/1/17 12/1/17





 07:00 15:00 23:00 07:00 15:00 23:00


 


Intake Total 1480 ml 207 ml 480 ml 350 ml 250 ml 


 


Output Total   600 ml   


 


Balance 1480 ml 207 ml -120 ml 350 ml 250 ml 


 


      


 


Intake Oral 480 ml  480 ml   


 


IV Total 1000 ml 207 ml  350 ml 250 ml 


 


Output Urine Total   600 ml   


 


# Voids 2  2   








Result Diagram:  


11/30/17 0605                                                                  

              11/30/17 0605





Imaging





Last Impressions








Chest X-Ray 11/30/17 0600 Signed





Impressions: 





 Service Date/Time:  Thursday, November 30, 2017 04:35 - CONCLUSION: No acute 





 disease.       Sergey Harley MD 


 


CT Angiography 11/28/17 0000 Signed





Impressions: 





 Service Date/Time:  Tuesday, November 28, 2017 17:20 - CONCLUSION:  Diffuse 





 miliary parenchymal lung disease. Potentially reactive central mediastinal and 





 bilateral rosalio are ilene prominence. Differential considerations would include 





 atypical pneumonias, pneumoconiosis, sarcoidosis, metastatic disease. No 





 evidence of pulmonary embolism.      Sergey Johnson MD 








Objective Remarks


GEN:  Lying in bed, no respiratory distress.


HEENT: Oral mucosa is dry. Neck veins are not grossly distended.


LUNGS: Mild expiratory wheezing and few coarse crackles. 


HEART: S1-S2 normal no murmurs


ABDOMEN: Abdomen is obese but soft.


EXTREMITIES:  +2 edema in lower extremities.


NEURO: Alert awake oriented no focal deficits





A/P


Problem List:  


(1) COPD exacerbation


ICD Code:  J44.1 - Chronic obstructive pulmonary disease with (acute) 

exacerbation


Status:  Acute


(2) Hypercapnic respiratory failure


ICD Code:  J96.92 - Respiratory failure, unspecified with hypercapnia


Assessment and Plan


IMPRESSION


Acute hypercapnic respiratory failure.


Chronic obstructive pulmonary disease exacerbation.


Leukocytosis with bandemia.


History of rheumatoid arthritis on Plaquenil.


Breast CA status post radiation treatment and lumpectomy.


Hypertension.


History of recent retinal detachment surgery


 


RECOMMENDATIONS


1.  Monitor neuro status closely and avoid any sedatives.


2.  Continue with oxygen maintain sat above 92%.


3.  DuoNeb q. 4+ q. 2 p.r.n. for shortness of breath.


4.  Continue with IV steroids. Pulm Dr. Oglesby


5.  Noninvasive positive pressure ventilation as needed.  


6.  CT pulmonary angiogram of the chest -diffuse miliary disease ? atypical 

pneumonia vs ILD RA associated. Defer to pulmonology


7.  Chronic lung disease in the seen in the setting of Plaquenil use.


8.  Monitor heart rate and blood pressure closely and maintain MAP greater 65 

mmHg.


9.  NS at 50 an hour- DC


10. Monitor renal function Is and Os and electrolyte replacement per protocol.


11. Regular diet, Pepcid 10 mg IV q. 12- change to PO


12. Continue with Rocephin and Zithromax 


13.  DVT prophylaxis with SCDs Lovenox 40 mg Subcu daily.


14.  The case discussed with Dr. Oglesby pulmonary service.


15. Patient with BL lower extremity edema. Will start patient on Iv lasix, 

monitor I's and O's, daily weight.





Problem Qualifiers





(1) Hypercapnic respiratory failure:  


Qualified Codes:  J96.22 - Acute and chronic respiratory failure with 

hypercapnia








Nick Walsh MD Dec 1, 2017 18:29